# Patient Record
Sex: FEMALE | Race: WHITE | NOT HISPANIC OR LATINO | Employment: UNEMPLOYED | URBAN - METROPOLITAN AREA
[De-identification: names, ages, dates, MRNs, and addresses within clinical notes are randomized per-mention and may not be internally consistent; named-entity substitution may affect disease eponyms.]

---

## 2018-03-08 ENCOUNTER — TRANSCRIBE ORDERS (OUTPATIENT)
Dept: RADIOLOGY | Facility: CLINIC | Age: 58
End: 2018-03-08

## 2018-03-08 ENCOUNTER — APPOINTMENT (OUTPATIENT)
Dept: RADIOLOGY | Facility: CLINIC | Age: 58
End: 2018-03-08
Payer: COMMERCIAL

## 2018-03-08 DIAGNOSIS — S90.32XA CONTUSION OF LEFT FOOT, INITIAL ENCOUNTER: Primary | ICD-10-CM

## 2018-03-08 DIAGNOSIS — S90.32XA CONTUSION OF LEFT FOOT, INITIAL ENCOUNTER: ICD-10-CM

## 2018-03-08 PROCEDURE — 73630 X-RAY EXAM OF FOOT: CPT

## 2021-02-03 ENCOUNTER — OFFICE VISIT (OUTPATIENT)
Dept: URGENT CARE | Facility: CLINIC | Age: 61
End: 2021-02-03
Payer: COMMERCIAL

## 2021-02-03 VITALS
RESPIRATION RATE: 14 BRPM | SYSTOLIC BLOOD PRESSURE: 220 MMHG | WEIGHT: 126 LBS | TEMPERATURE: 99 F | HEART RATE: 93 BPM | BODY MASS INDEX: 20.99 KG/M2 | OXYGEN SATURATION: 99 % | HEIGHT: 65 IN | DIASTOLIC BLOOD PRESSURE: 102 MMHG

## 2021-02-03 DIAGNOSIS — S99.919A ANKLE INJURY, INITIAL ENCOUNTER: Primary | ICD-10-CM

## 2021-02-03 PROCEDURE — 99213 OFFICE O/P EST LOW 20 MIN: CPT | Performed by: NURSE PRACTITIONER

## 2021-02-03 NOTE — PATIENT INSTRUCTIONS
Wear boot when ambulating   Tylenol as needed for pain   Rest, ice, and elevate  Follow up with orthopedics   Obtain a PCP    It is advised that you proceed to the ER for further evaluation and management of your blood pressure  Ankle Sprain   WHAT YOU NEED TO KNOW:   An ankle sprain happens when 1 or more ligaments in your ankle joint stretch or tear  Ligaments are tough tissues that connect bones  Ligaments support your joints and keep your bones in place  DISCHARGE INSTRUCTIONS:   Return to the emergency department if:   · You have severe pain in your ankle  · Your foot or toes are cold or numb  · Your ankle becomes more weak or unstable (wobbly)  · You are unable to put any weight on your ankle or foot  · Your swelling has increased or returned  Call your doctor if:   · Your pain does not go away, even after treatment  · You have questions or concerns about your condition or care  Medicines: You may need any of the following:  · NSAIDs , such as ibuprofen, help decrease swelling, pain, and fever  This medicine is available with or without a doctor's order  NSAIDs can cause stomach bleeding or kidney problems in certain people  If you take blood thinner medicine, always ask your healthcare provider if NSAIDs are safe for you  Always read the medicine label and follow directions  · Acetaminophen  decreases pain and fever  It is available without a doctor's order  Ask how much to take and how often to take it  Follow directions  Read the labels of all other medicines you are using to see if they also contain acetaminophen, or ask your doctor or pharmacist  Acetaminophen can cause liver damage if not taken correctly  Do not use more than 4 grams (4,000 milligrams) total of acetaminophen in one day  · Prescription pain medicine  may be given  Ask your healthcare provider how to take this medicine safely  Some prescription pain medicines contain acetaminophen   Do not take other medicines that contain acetaminophen without talking to your healthcare provider  Too much acetaminophen may cause liver damage  Prescription pain medicine may cause constipation  Ask your healthcare provider how to prevent or treat constipation  · Take your medicine as directed  Contact your healthcare provider if you think your medicine is not helping or if you have side effects  Tell him or her if you are allergic to any medicine  Keep a list of the medicines, vitamins, and herbs you take  Include the amounts, and when and why you take them  Bring the list or the pill bottles to follow-up visits  Carry your medicine list with you in case of an emergency  Self-care:   · Use support devices,  such as a brace, cast, or splint, to limit your movement and protect your joint  You may need to use crutches to decrease your pain as you move around  · Go to physical therapy as directed  A physical therapist teaches you exercises to help improve movement and strength, and to decrease pain  · Rest  your ankle so that it can heal  Return to normal activities as directed  · Apply ice  on your ankle for 15 to 20 minutes every hour or as directed  Use an ice pack, or put crushed ice in a plastic bag  Cover it with a towel  Ice helps prevent tissue damage and decreases swelling and pain  · Compress  your ankle  Ask if you should wrap an elastic bandage around your injured ligament  An elastic bandage provides support and helps decrease swelling and movement so your joint can heal  Wear as long as directed  · Elevate  your ankle above the level of your heart as often as you can  This will help decrease swelling and pain  Prop your ankle on pillows or blankets to keep it elevated comfortably  Prevent another ankle sprain:   · Let your ankle heal   Find out how long your ligament needs to heal  Do not do any physical activity until your healthcare provider says it is okay   If you start activity too soon, you may develop a more serious injury  · Always warm up and stretch  before you exercise or play sports  · Use the right equipment  Always wear shoes that fit well and are made for the activity that you are doing  You may also need ankle supports, elbow and knee pads, or braces  Follow up with your doctor as directed:  Write down your questions so you remember to ask them during your visits  © Copyright [address] Information is for End User's use only and may not be sold, redistributed or otherwise used for commercial purposes  All illustrations and images included in CareNotes® are the copyrighted property of A D [employer] M [employer]  or [address] [place], [place] [address]  The above information is an  only  It is not intended as medical advice for individual conditions or treatments  Talk to your doctor, nurse or pharmacist before following any medical regimen to see if it is safe and effective for you  Chronic Hypertension   WHAT YOU NEED TO KNOW:   Hypertension is high blood pressure  Your blood pressure is the force of your blood moving against the walls of your arteries  Hypertension causes your blood pressure to get so high that your heart has to work much harder than normal  This can damage your heart  Even if you have hypertension for years, lifestyle changes, medicines, or both can help bring your blood pressure to normal   DISCHARGE INSTRUCTIONS:   Call 911 for any of the following:   · You have chest pain  · You have any of the following signs of a heart attack:      ? Squeezing, pressure, or pain in your chest    ? You may  also have any of the following:     § Discomfort or pain in your back, neck, jaw, stomach, or arm    § Shortness of breath    § Nausea or vomiting    § Lightheadedness or a sudden cold sweat    · You become confused or have difficulty speaking  · You suddenly feel lightheaded or have trouble breathing      Return to the emergency department if:   · You have a severe headache or vision loss  · You have weakness in an arm or leg  Contact your healthcare provider if:   · You feel faint, dizzy, confused, or drowsy  · You have been taking your blood pressure medicine but your pressure is higher than your provider says it should be  · You have questions or concerns about your condition or care  Medicines: You may need any of the following:  · Antihypertensives  may be used to help lower your blood pressure  Several kinds of medicines are available  Your healthcare provider may change the medicine or medicines you currently take  This may be needed if your blood pressure is often high when you check it at home or you are having other problems with blood pressure control  · Diuretics  help decrease extra fluid that collects in your body  This will help lower your BP  You may urinate more often while you take this medicine  · Cholesterol medicine  helps lower your cholesterol level  A low cholesterol level helps prevent heart disease and makes it easier to control your blood pressure  · Take your medicine as directed  Contact your healthcare provider if you think your medicine is not helping or if you have side effects  Tell him or her if you are allergic to any medicine  Keep a list of the medicines, vitamins, and herbs you take  Include the amounts, and when and why you take them  Bring the list or the pill bottles to follow-up visits  Carry your medicine list with you in case of an emergency  Follow up with your healthcare provider as directed: You will need to return to have your blood pressure checked and to have other lab tests done  Write down your questions so you remember to ask them during your visits  Stages of hypertension:       · Normal blood pressure is 119/79 or lower   Your healthcare provider may only check your blood pressure each year if it stays at a normal level  · Elevated blood pressure is 120/79 to 129/79    This is sometimes called prehypertension  Your healthcare provider may suggest lifestyle changes to help lower your blood pressure to a normal level  He or she may then check it again in 3 to 6 months  · Stage 1 hypertension is 130/80  to 139/89   Your provider may recommend lifestyle changes, medication, and checks every 3 to 6 months until your blood pressure is controlled  · Stage 2 hypertension is 140/90 or higher   Your provider will recommend lifestyle changes and have you take 2 kinds of hypertension medicines  You will also need to have your blood pressure checked monthly until it is controlled  Manage chronic hypertension:   · Check your blood pressure at home  Avoid smoking, caffeine, and exercise at least 30 minutes before checking your blood pressure  Sit and rest for 5 minutes before you take your blood pressure  Extend your arm and support it on a flat surface  Your arm should be at the same level as your heart  Follow the directions that came with your blood pressure monitor  Check your blood pressure 2 times, 1 minute apart, before you take your medicine in the morning  Also check your blood pressure before your evening meal  Keep a record of your readings and bring it to your follow-up visits  Ask your healthcare provider what your blood pressure should be  · Manage any other health conditions you have  Health conditions such as diabetes can increase your risk for hypertension  Follow your healthcare provider's instructions and take all your medicines as directed  Talk to your healthcare provider about any new health conditions you have recently developed  · Ask about all medicines  Certain medicines can increase your blood pressure  Examples include oral birth control pills, decongestants, herbal supplements, and NSAIDs, such as ibuprofen  Your healthcare provider can tell you which medicines are safe for you to take   This includes prescription and over-the-counter medicines  Lifestyle changes you can make to lower your blood pressure: Your provider may want you to make more lifestyle changes if you are having trouble controlling your blood pressure  This may feel difficult over time, especially if you think you are making good changes but your pressure is still high  It might help to focus on one new change at a time  For example, try to add 1 more day of exercise, or exercise for an extra 10 minutes on 2 days  Small changes can make a big difference  Your healthcare provider can also refer you to specialists such as a dietitian who can help you make small changes  · Limit sodium (salt) as directed  Too much sodium can affect your fluid balance  Check labels to find low-sodium or no-salt-added foods  Some low-sodium foods use potassium salts for flavor  Too much potassium can also cause health problems  Your healthcare provider will tell you how much sodium and potassium are safe for you to have in a day  He or she may recommend that you limit sodium to 2,300 mg a day  · Follow the meal plan recommended by your healthcare provider  A dietitian or your provider can give you more information on low-sodium plans or the DASH (Dietary Approaches to Stop Hypertension) eating plan  The DASH plan is low in sodium, unhealthy fats, and total fat  It is high in potassium, calcium, and fiber  · Exercise to maintain a healthy weight  Exercise at least 30 minutes per day, on most days of the week  This will help decrease your blood pressure  Ask your healthcare provider about the best exercise plan for you  · Decrease stress  This may help lower your blood pressure  Learn ways to relax, such as deep breathing or listening to music  · Limit alcohol as directed  Alcohol can increase your blood pressure  A drink of alcohol is 12 ounces of beer, 5 ounces of wine, or 1½ ounces of liquor  · Do not smoke    Nicotine and other chemicals in cigarettes and cigars can increase your blood pressure and also cause lung damage  Ask your healthcare provider for information if you currently smoke and need help to quit  E-cigarettes or smokeless tobacco still contain nicotine  Talk to your healthcare provider before you use these products  © Copyright [address] Information is for End User's use only and may not be sold, redistributed or otherwise used for commercial purposes  All illustrations and images included in CareNotes® are the copyrighted property of [email or web address]  or [facility] [employer]   The above information is an  only  It is not intended as medical advice for individual conditions or treatments  Talk to your doctor, nurse or pharmacist before following any medical regimen to see if it is safe and effective for you

## 2021-02-04 NOTE — PROGRESS NOTES
[facility]        NAME: Billy Apple is a 56 y o  female  : 1960    MRN: [id number]  DATE: 2021  TIME: 11:58 AM    Assessment and Plan   Ankle injury, initial encounter [[id number]]  1  Ankle injury, initial encounter  XR ankle 3+ vw left    Orthopedic injury treatment    CANCELED: XR ankle 3+ vw left     Discussed elevated blood pressure with patient  She was advised to proceed to the emergency department for evaluation and treatment  She does not have a PCP and denies prior history of HTN  Suspected distal fibular fracture  Walking boot applied to left ankle  Orthopedics referral given to patient   to assist with scheduling appointment  Patient Instructions   Wear boot when ambulating   Tylenol as needed for pain   Rest, ice, and elevate  Follow up with orthopedics   Obtain a PCP    It is advised that you proceed to the ER for further evaluation and management of your blood pressure  Follow up with PCP in 3-5 days  Proceed to  ER if symptoms worsen  Chief Complaint     Chief Complaint   Patient presents with    Ankle Pain     Slipped on ice  night and twisted left ankle-pt minimaly elevated  -pt did not put ice on foot after injury          History of Present Illness       Patient is a 56year old female presenting with left ankle pain  She states that she slipped on ice and twisted her ankle  Pain is worse with ambulating  Denies N/T/W  She took tylenol and applied ice  Review of Systems   Review of Systems   Constitutional: Negative for activity change, chills and fever  Respiratory: Negative for cough and shortness of breath  Gastrointestinal: Negative for diarrhea, nausea and vomiting  Musculoskeletal: Positive for arthralgias  Negative for joint swelling  Skin: Negative for color change and wound  Neurological: Negative for dizziness, weakness, numbness and headaches           Current Medications     No current outpatient medications on file  Current Allergies     Allergies as of 02/03/2021    (No Known Allergies)            The following portions of the patient's history were reviewed and updated as appropriate: allergies, current medications, past family history, past medical history, past social history, past surgical history and problem list      History reviewed  No pertinent past medical history  History reviewed  No pertinent surgical history  No family history on file  Medications have been verified  Objective   BP (!) 220/102   Pulse 93   Temp 99 °F (37 2 °C)   Resp 14   Ht 5' 5" (1 651 m)   Wt 57 2 kg (126 lb)   SpO2 99%   BMI 20 97 kg/m²      Left ankle xray: Suspected distal fibular fracture  Physical Exam     Physical Exam  Vitals signs reviewed  Constitutional:       General: She is awake  She is not in acute distress  Appearance: Normal appearance  HENT:      Head: Normocephalic  Cardiovascular:      Rate and Rhythm: Normal rate and regular rhythm  Pulses:           Dorsalis pedis pulses are 2+ on the left side  Posterior tibial pulses are 2+ on the left side  Heart sounds: Normal heart sounds, S1 normal and S2 normal    Pulmonary:      Effort: Pulmonary effort is normal       Breath sounds: Normal breath sounds  No decreased breath sounds, wheezing, rhonchi or rales  Musculoskeletal:        Feet:    Feet:      Left foot:      Skin integrity: Skin integrity normal    Skin:     General: Skin is warm and moist    Neurological:      General: No focal deficit present  Mental Status: She is alert, oriented to person, place, and time and easily aroused  Psychiatric:         Behavior: Behavior is cooperative         Orthopedic injury treatment    Date/Time: 2/3/2021 12:14 PM  Performed by: ADAM Bonds  Authorized by: ADAM Bonds     Patient Location:  Clinic  Consent given by:  Patient  Patient identity confirmed:  Verbally with patient  Injury location:  Ankle  Neurovascular status: Neurovascularly intact    Distal perfusion: normal    Neurological function: normal    Range of motion: reduced    Local anesthesia used?: No    Immobilization: walking boot    Splint type: walking boot   Neurovascular status: Neurovascularly intact    Distal perfusion: normal    Neurological function: normal    Range of motion: unchanged    Patient tolerance:  Patient tolerated the procedure well with no immediate complications

## 2021-02-09 ENCOUNTER — APPOINTMENT (OUTPATIENT)
Dept: RADIOLOGY | Facility: CLINIC | Age: 61
End: 2021-02-09
Payer: COMMERCIAL

## 2021-02-09 ENCOUNTER — OFFICE VISIT (OUTPATIENT)
Dept: FAMILY MEDICINE CLINIC | Facility: CLINIC | Age: 61
End: 2021-02-09
Payer: COMMERCIAL

## 2021-02-09 ENCOUNTER — OFFICE VISIT (OUTPATIENT)
Dept: OBGYN CLINIC | Facility: CLINIC | Age: 61
End: 2021-02-09
Payer: COMMERCIAL

## 2021-02-09 VITALS
WEIGHT: 126 LBS | DIASTOLIC BLOOD PRESSURE: 100 MMHG | HEART RATE: 83 BPM | OXYGEN SATURATION: 99 % | HEIGHT: 65 IN | SYSTOLIC BLOOD PRESSURE: 170 MMHG | BODY MASS INDEX: 20.99 KG/M2

## 2021-02-09 VITALS
BODY MASS INDEX: 20.99 KG/M2 | HEIGHT: 65 IN | DIASTOLIC BLOOD PRESSURE: 123 MMHG | WEIGHT: 126 LBS | HEART RATE: 96 BPM | SYSTOLIC BLOOD PRESSURE: 186 MMHG

## 2021-02-09 DIAGNOSIS — F17.200 CURRENT EVERY DAY SMOKER: ICD-10-CM

## 2021-02-09 DIAGNOSIS — Z80.3 FAMILY HISTORY OF BREAST CANCER IN MOTHER: ICD-10-CM

## 2021-02-09 DIAGNOSIS — S82.839A AVULSION FRACTURE OF DISTAL FIBULA: Primary | ICD-10-CM

## 2021-02-09 DIAGNOSIS — S82.392A CLOSED FRACTURE OF POSTERIOR MALLEOLUS OF LEFT TIBIA, INITIAL ENCOUNTER: ICD-10-CM

## 2021-02-09 DIAGNOSIS — Z78.9 HEAVY DRINKER OF ALCOHOL: ICD-10-CM

## 2021-02-09 DIAGNOSIS — R41.3 MEMORY LOSS: ICD-10-CM

## 2021-02-09 DIAGNOSIS — F32.9 REACTIVE DEPRESSION: ICD-10-CM

## 2021-02-09 DIAGNOSIS — Z12.31 ENCOUNTER FOR SCREENING MAMMOGRAM FOR MALIGNANT NEOPLASM OF BREAST: ICD-10-CM

## 2021-02-09 DIAGNOSIS — Z82.49 FAMILY HISTORY OF HEART ATTACK: ICD-10-CM

## 2021-02-09 DIAGNOSIS — Z76.89 ENCOUNTER TO ESTABLISH CARE: Primary | ICD-10-CM

## 2021-02-09 DIAGNOSIS — S82.839A AVULSION FRACTURE OF DISTAL FIBULA: ICD-10-CM

## 2021-02-09 DIAGNOSIS — Z00.00 ANNUAL PHYSICAL EXAM: ICD-10-CM

## 2021-02-09 DIAGNOSIS — Z12.11 COLON CANCER SCREENING: ICD-10-CM

## 2021-02-09 DIAGNOSIS — Z12.4 CERVICAL CANCER SCREENING: ICD-10-CM

## 2021-02-09 DIAGNOSIS — R03.0 ELEVATED BLOOD PRESSURE READING: ICD-10-CM

## 2021-02-09 DIAGNOSIS — M25.572 PAIN, JOINT, ANKLE AND FOOT, LEFT: ICD-10-CM

## 2021-02-09 DIAGNOSIS — Z13.0 SCREENING FOR DEFICIENCY ANEMIA: ICD-10-CM

## 2021-02-09 DIAGNOSIS — Z11.59 NEED FOR HEPATITIS C SCREENING TEST: ICD-10-CM

## 2021-02-09 DIAGNOSIS — Z28.21 PNEUMOCOCCAL VACCINATION DECLINED BY PATIENT: ICD-10-CM

## 2021-02-09 PROCEDURE — 99203 OFFICE O/P NEW LOW 30 MIN: CPT | Performed by: ORTHOPAEDIC SURGERY

## 2021-02-09 PROCEDURE — 73610 X-RAY EXAM OF ANKLE: CPT

## 2021-02-09 PROCEDURE — 27786 TREATMENT OF ANKLE FRACTURE: CPT | Performed by: ORTHOPAEDIC SURGERY

## 2021-02-09 PROCEDURE — 99204 OFFICE O/P NEW MOD 45 MIN: CPT | Performed by: FAMILY MEDICINE

## 2021-02-09 PROCEDURE — 99386 PREV VISIT NEW AGE 40-64: CPT | Performed by: FAMILY MEDICINE

## 2021-02-09 RX ORDER — LISINOPRIL 10 MG/1
10 TABLET ORAL DAILY
Qty: 30 TABLET | Refills: 0 | Status: SHIPPED | OUTPATIENT
Start: 2021-02-09 | End: 2021-02-24

## 2021-02-09 NOTE — PROGRESS NOTES
Assessment/Plan:   Diagnoses and all orders for this visit:    Encounter to establish care  Annual physical exam  - reviewed PMHx, PSHx, meds, allergies, FHx, Soc Hx and Sexual Hx  - UTD with Tdap and Flu vaccine for this season   - declined Pneumovax at today's visit  - no recent labs - script given   - does not recall last PAP, Mammo - of note, (+) FHx of Breast Ca in Mom - referred to Ob/Gyn and script given for Mammo   - declined STD screening in the office today   - has never had Colon Ca screening - script given for Cologuard and referral to GI   - (+) visual impairment - referred to Optho   - advised to schedule with Dentist  - RTO in 1yr for annual   Pneumococcal vaccination declined by patient    Avulsion fracture of distal fibula  -     DXA bone density spine hip and pelvis; Future  - follows with Ortho - management per Specialist team     Family history of breast cancer in mother  - Mom d/w Breast Ca at 67yo   Encounter for screening mammogram for malignant neoplasm of breast  -     Mammo screening bilateral w 3d & cad; Future  Cervical cancer screening  -     Ambulatory referral to Gynecology; Future    Colon cancer screening  -     Ambulatory referral for colonoscopy; Future  -     Cologuard; Future    Screening for deficiency anemia  -     CBC and differential; Future  -     Iron Panel (Includes Ferritin, Iron Sat%, Iron, and TIBC); Future    Need for hepatitis C screening test  -     Hepatitis C antibody; Future    Elevated blood pressure reading  -     Comprehensive metabolic panel; Future  -     Microalbumin / creatinine urine ratio  -     lisinopril (ZESTRIL) 10 mg tablet; Take 1 tablet (10 mg total) by mouth daily  -     Ambulatory referral to Ophthalmology;  Future  - BP in the office 140/88 and on my repeat 170/100   - higher at Ortho appt earlier today   - (+) drinks 24oz of coffee/day -- advised to cut back on caffeine intake   - (+) drinks 4glasses of wine or 2 [place] QD -- advised to cut back on EtOH intake   - (+) current smoker (2-4cigs/day) -- smoking cessation advised   - (+) FHx of MI in Father at 65yo and PGF at 61yo   - no recent labs - script given   - will empirically start on ACEI 10mg QD   - advised to RTO in 2wks with labs and BP cuff for f/u - pt aware and agreeable   Family history of heart attack    Heavy drinker of alcohol  -     Vitamin D 25 hydroxy; Future  -     Vitamin B12; Future  - (+) drinks 4glasses of wine or 2 [place] QD -- advised to cut back on EtOH intake   Current every day smoker  (+) current smoker (2-4cigs/day) -- smoking cessation advised   Memory loss    Reactive depression  -     TSH, 3rd generation with Free T4 reflex  -     Ambulatory referral to [facility]; Future  - PHQ-9 score of 5 in the office today   - a horse (of 21yrs), 2 dogs and kitten passed away recently   - maybe compensating with heavy EtOH use/abuse  - referred to interim in-office therapist - pt aware and agreeable         Subjective:    Patient ID: Misti Bach is a 56 y o  female    Misti Bach is a 56 y o  female who presents to the office to establish care/annual exam and eval of high pressures   1) Establish Care/Annual Exam   - prior PCP: does not have one   - PMHx: Avulsion fracture of distal fibula  - allergies: NKDA  - Meds: none   - PSHx: wisdom tooth extraction   - FHx: Mother (breast ca at 65yo), F (MI at 61), PGF (MI at 61), denies FHx of DM/HTN/HL   - Immunizations: UTD with Tdap (2020) and Flu vaccine this season; declined Pneumovax in the office today   - GYN Hx: does not have an Ob/Gyn, last PAP/Mammo was >3yrs ago   - Hm: has never had colon ca screening   - diet/exercise: rides horses, diet: varied   - social: drinks 4glasses of wine or 2 [place] QD, current smoker ([id number]/CJB), denies illicits   - sexual Hx: declined STD screening in the office today   - last vision: has reading glasses from the [address]  - last dental: does not recall   - ROS: today in the office pt denies F/C/N/V/visual changes/CP/SOB/wheezing/abd pain/D/LE edema or calf tenderness  2) Reactive Depression  - PHQ-9 score of 5  - a horse (of 21yrs), 2 dogs and kitten passed away recently   3) elevated BP   - BP in the office 140/88 and on my repeat 170/100   - (+) drinks 24oz of coffee/day   - (+) drinks 4glasses of wine or 2 [place] QD  - (+) current smoker (2-4cigs/day)  - denies change in vision  - (+) HA, intermittent heart racing   - (+) FHx of MI in Father and PGF btw 60-62yo   - no recent labs       The following portions of the patient's history were reviewed and updated as appropriate: allergies, current medications, past family history, past medical history, past social history, past surgical history and problem list     Review of Systems  as per HPI    Objective:  /100 (BP Location: Left arm, Patient Position: Sitting, Cuff Size: Standard)   Pulse 83   Ht 5' 5" (1 651 m)   Wt 57 2 kg (126 lb)   SpO2 99%   BMI 20 97 kg/m²    Physical Exam  Vitals signs reviewed  Constitutional:       General: She is not in acute distress  Appearance: Normal appearance  She is normal weight  She is not ill-appearing, toxic-appearing or diaphoretic  HENT:      Head: Normocephalic and atraumatic  Right Ear: External ear normal       Left Ear: External ear normal    Eyes:      General: No scleral icterus  Right eye: No discharge  Left eye: No discharge  Extraocular Movements: Extraocular movements intact  Conjunctiva/sclera: Conjunctivae normal    Neck:      Musculoskeletal: Normal range of motion  Cardiovascular:      Rate and Rhythm: Normal rate and regular rhythm  Heart sounds: Normal heart sounds  No murmur  No friction rub  No gallop  Pulmonary:      Effort: Pulmonary effort is normal  No respiratory distress  Breath sounds: Normal breath sounds  No stridor  No wheezing, rhonchi or rales     Musculoskeletal:      Right lower leg: No edema  Comments: (+) boot on LLE - Avulsion fracture of distal fibula   Skin:     General: Skin is warm  Neurological:      General: No focal deficit present  Mental Status: She is alert and oriented to person, place, and time  Psychiatric:         Attention and Perception: Attention normal          Mood and Affect: Mood and affect normal          Speech: Speech normal          Behavior: Behavior normal  Behavior is cooperative  Thought Content: Thought content normal  Thought content does not include homicidal or suicidal ideation  Thought content does not include homicidal or suicidal plan  Cognition and Memory: Cognition normal          Judgment: Judgment normal       Comments: PHQ-9 score of 5 in the office today        Depression Screening Follow-up Plan: Patient's depression screening was positive with a PHQ-2 score of 3  Their PHQ-9 score was 5  Patient assessed for underlying major depression  They have no active suicidal ideations  Brief counseling provided and recommend additional follow-up/re-evaluation next office visit  Continue regular follow-up with their psychologist/therapist/psychiatrist who is managing their mental health condition(s)  Patient advised to follow-up with PCP for further management  I have spent 60 minutes with Patient  today in which greater than 50% of this time was spent in counseling/coordination of care regarding Prognosis, Risks and benefits of tx options, Intructions for management, Patient and family education, Importance of tx compliance, Risk factor reductions and Impressions

## 2021-02-09 NOTE — PROGRESS NOTES
Assessment/Plan:  1  Avulsion fracture of distal fibula  XR ankle 3+ vw left   2  Closed fracture of posterior malleolus of left tibia, initial encounter         Shae has a left ankle injury which appears to be an avulsion fracture of the distal fibula which was seen on previous x-ray and much clearer on today's imaging  She also appears to have a posterior malleolus fracture which appears nondisplaced and should remain stable  We will treat her nonsurgically at this time and keep her in the Cam walker boot  She will follow up in 3 weeks for repeat x-ray and evaluation  Subjective:   Maria Canales is a 56 y o  female who presents   To the office for evaluation for a left ankle injury which occurred 1 week ago  She slipped and fell on ice and twisted her left ankle  She went to urgent care that day where she had an x-ray concerning for an avulsion fracture of the lateral  Malleolus  She has been placed in a Cam walker boot  She states that her ankle feels much better after walking in the boot for the last week  She still has some swelling and bruising throughout the ankle  It worsens with motion improves with rest   She denies any numbness or tingling throughout her foot  Review of Systems   Constitutional: Negative for chills, fever and unexpected weight change  HENT: Negative for hearing loss, nosebleeds and sore throat  Eyes: Negative for pain, redness and visual disturbance  Respiratory: Negative for cough, shortness of breath and wheezing  Cardiovascular: Negative for chest pain, palpitations and leg swelling  Gastrointestinal: Negative for abdominal pain, nausea and vomiting  Endocrine: Negative for polydipsia and polyuria  Genitourinary: Negative for dysuria and hematuria  Musculoskeletal:        See HPI   Skin: Negative for rash and wound  Neurological: Negative for dizziness, numbness and headaches     Psychiatric/Behavioral: Negative for decreased concentration and suicidal ideas  The patient is not nervous/anxious  History reviewed  No pertinent past medical history  History reviewed  No pertinent surgical history  History reviewed  No pertinent family history  Social History     Occupational History    Not on file   Tobacco Use    Smoking status: Current Every Day Smoker    Smokeless tobacco: Never Used   Substance and Sexual Activity    Alcohol use: Yes    Drug use: Never    Sexual activity: Not on file       No current outpatient medications on file  No Known Allergies    Objective:  Vitals:    02/09/21 1143   BP: (!) 186/123   Pulse: 96       Left Ankle Exam     Tenderness   The patient is experiencing tenderness in the lateral malleolus and medial malleolus  Swelling: mild    Range of Motion   Dorsiflexion: normal   Plantar flexion: normal   Eversion:  15 abnormal   Inversion:  20 abnormal     Muscle Strength   Dorsiflexion:  5/5   Plantar flexion:  5/5   Anterior tibial:  5/5   Posterior tibial:  5/5  Gastrocsoleus:  5/5  Peroneal muscle:  4/5    Tests   Anterior drawer: negative    Other   Erythema: absent  Sensation: normal  Pulse: present          Strength/Myotome Testing     Left Ankle/Foot   Dorsiflexion: 5  Plantar flexion: 5      Physical Exam  Vitals signs reviewed  Constitutional:       Appearance: She is well-developed  HENT:      Head: Normocephalic and atraumatic  Eyes:      Conjunctiva/sclera: Conjunctivae normal       Pupils: Pupils are equal, round, and reactive to light  Neck:      Musculoskeletal: Normal range of motion and neck supple  Cardiovascular:      Rate and Rhythm: Normal rate  Pulmonary:      Effort: Pulmonary effort is normal  No respiratory distress  Skin:     General: Skin is warm and dry  Neurological:      Mental Status: She is alert and oriented to person, place, and time     Psychiatric:         Behavior: Behavior normal            I have personally reviewed pertinent films in PACS and my interpretation is as follows: Three-view x-rays of the left ankle in the office today demonstrates a nondisplaced avulsion fracture of the distal fibula as well as a nondisplaced posterior malleolus avulsion fracture  Fracture / Dislocation Treatment    Date/Time: 2/9/2021 1:36 PM  Performed by: Alvan Spurling, DO  Authorized by: Alvan Spurling, DO     Patient Location:  Clinic  Verbal consent obtained?: Yes    Risks and benefits: Risks, benefits and alternatives were discussed    Radiology Images displayed and confirmed   If images not available, report reviewed: Yes    Injury location:  Ankle  Location details:  Left ankle  Injury type:  Fracture  Fracture type: lateral malleolus    Fracture type: lateral malleolus    Neurovascular status: Neurovascularly intact    Manipulation performed?: No

## 2021-02-09 NOTE — PROGRESS NOTES
Assessment/Plan:   Diagnoses and all orders for this visit:    Avulsion fracture of distal fibula  -     DXA bone density spine hip and pelvis; Future  - follows with Ortho - management per Specialist team     Elevated blood pressure reading  -     Comprehensive metabolic panel; Future  -     Microalbumin / creatinine urine ratio  -     lisinopril (ZESTRIL) 10 mg tablet; Take 1 tablet (10 mg total) by mouth daily  -     Ambulatory referral to Ophthalmology; Future  - BP in the office 140/88 and on my repeat 170/100   - higher at Ortho appt earlier today   - (+) drinks 24oz of coffee/day -- advised to cut back on caffeine intake   - (+) drinks 4glasses of wine or 2 [place] QD -- advised to cut back on EtOH intake   - (+) current smoker (2-4cigs/day) -- smoking cessation advised   - (+) FHx of MI in Father at 63yo and PGF at 61yo   - no recent labs - script given   - will empirically start on ACEI 10mg QD   - advised to RTO in 2wks with labs and BP cuff for f/u - pt aware and agreeable   Family history of heart attack  Heavy drinker of alcohol  -     Vitamin D 25 hydroxy; Future  -     Vitamin B12; Future  - (+) drinks 4glasses of wine or 2 [place] QD -- advised to cut back on EtOH intake   Current every day smoker  (+) current smoker (2-4cigs/day) -- smoking cessation advised   Memory loss    Reactive depression  -     TSH, 3rd generation with Free T4 reflex  -     Ambulatory referral to [facility]; Future  - PHQ-9 score of 5 in the office today   - a horse (of 21yrs), 2 dogs and kitten passed away recently   - maybe compensating with heavy EtOH use/abuse  - referred to interim in-office therapist - pt aware and agreeable         Subjective:    Patient ID: John Paul Douglass is a 56 y o  female    John Paul Douglass is a 56 y o  female who presents to the office to establish care/annual exam and eval of high pressures   1) Establish Care/Annual Exam   - prior PCP: does not have one   - PMHx: Avulsion fracture of distal fibula  - allergies: NKDA  - Meds: none   - PSHx: wisdom tooth extraction   - FHx: Mother (breast ca at 65yo), F (MI at 61), PGF (MI at 61), denies FHx of DM/HTN/HL   - Immunizations: UTD with Tdap (2020) and Flu vaccine this season; declined Pneumovax in the office today   - GYN Hx: does not have an Ob/Gyn, last PAP/Mammo was >3yrs ago   - Hm: has never had colon ca screening   - diet/exercise: rides horses, diet: varied   - social: drinks 4glasses of wine or 2 [place] QD, current smoker ([id number]), denies illicits   - sexual Hx: declined STD screening in the office today   - last vision: has reading glasses from the [employer]  - last dental: does not recall   - ROS: today in the office pt denies F/C/N/V/visual changes/CP/SOB/wheezing/abd pain/D/LE edema or calf tenderness  2) Reactive Depression  - PHQ-9 score of 5  - a horse (of 21yrs), 2 dogs and kitten passed away recently   3) elevated BP   - BP in the office 140/88 and on my repeat 170/100   - (+) drinks 24oz of coffee/day   - (+) drinks 4glasses of wine or 2 [place] QD  - (+) current smoker (2-4cigs/day)  - denies change in vision  - (+) HA, intermittent heart racing   - (+) FHx of MI in Father and PGF btw 60-62yo   - no recent labs       The following portions of the patient's history were reviewed and updated as appropriate: allergies, current medications, past family history, past medical history, past social history, past surgical history and problem list     Review of Systems  as per HPI    Objective:  /100 (BP Location: Left arm, Patient Position: Sitting, Cuff Size: Standard)   Pulse 83   Ht 5' 5" (1 651 m)   Wt 57 2 kg (126 lb)   SpO2 99%   BMI 20 97 kg/m²    Physical Exam  Vitals signs reviewed  Constitutional:       General: She is not in acute distress  Appearance: Normal appearance  She is normal weight  She is not ill-appearing, toxic-appearing or diaphoretic     HENT:      Head: Normocephalic and atraumatic  Right Ear: External ear normal       Left Ear: External ear normal    Eyes:      General: No scleral icterus  Right eye: No discharge  Left eye: No discharge  Extraocular Movements: Extraocular movements intact  Conjunctiva/sclera: Conjunctivae normal    Neck:      Musculoskeletal: Normal range of motion  Cardiovascular:      Rate and Rhythm: Normal rate and regular rhythm  Heart sounds: Normal heart sounds  No murmur  No friction rub  No gallop  Pulmonary:      Effort: Pulmonary effort is normal  No respiratory distress  Breath sounds: Normal breath sounds  No stridor  No wheezing, rhonchi or rales  Musculoskeletal:      Right lower leg: No edema  Comments: (+) boot on LLE - Avulsion fracture of distal fibula   Skin:     General: Skin is warm  Neurological:      General: No focal deficit present  Mental Status: She is alert and oriented to person, place, and time  Psychiatric:         Attention and Perception: Attention normal          Mood and Affect: Mood and affect normal          Speech: Speech normal          Behavior: Behavior normal  Behavior is cooperative  Thought Content: Thought content normal  Thought content does not include homicidal or suicidal ideation  Thought content does not include homicidal or suicidal plan  Cognition and Memory: Cognition normal          Judgment: Judgment normal       Comments: PHQ-9 score of 5 in the office today        Depression Screening Follow-up Plan: Patient's depression screening was positive with a PHQ-2 score of 3  Their PHQ-9 score was 5  Patient assessed for underlying major depression  They have no active suicidal ideations  Brief counseling provided and recommend additional follow-up/re-evaluation next office visit  Continue regular follow-up with their psychologist/therapist/psychiatrist who is managing their mental health condition(s)   Patient advised to follow-up with PCP for further management  I have spent 60 minutes with Patient  today in which greater than 50% of this time was spent in counseling/coordination of care regarding Prognosis, Risks and benefits of tx options, Intructions for management, Patient and family education, Importance of tx compliance, Risk factor reductions and Impressions

## 2021-02-09 NOTE — PATIENT INSTRUCTIONS

## 2021-02-16 ENCOUNTER — TELEPHONE (OUTPATIENT)
Dept: GASTROENTEROLOGY | Facility: CLINIC | Age: 61
End: 2021-02-16

## 2021-02-17 ENCOUNTER — LAB (OUTPATIENT)
Dept: LAB | Facility: CLINIC | Age: 61
End: 2021-02-17
Payer: COMMERCIAL

## 2021-02-17 ENCOUNTER — TRANSCRIBE ORDERS (OUTPATIENT)
Dept: LAB | Facility: CLINIC | Age: 61
End: 2021-02-17

## 2021-02-17 DIAGNOSIS — Z78.9 HEAVY DRINKER OF ALCOHOL: ICD-10-CM

## 2021-02-17 DIAGNOSIS — Z13.0 SCREENING FOR DEFICIENCY ANEMIA: ICD-10-CM

## 2021-02-17 DIAGNOSIS — Z11.59 NEED FOR HEPATITIS C SCREENING TEST: ICD-10-CM

## 2021-02-17 DIAGNOSIS — R03.0 ELEVATED BLOOD PRESSURE READING: ICD-10-CM

## 2021-02-17 LAB
25(OH)D3 SERPL-MCNC: 14.8 NG/ML (ref 30–100)
ALBUMIN SERPL BCP-MCNC: 4 G/DL (ref 3.5–5)
ALP SERPL-CCNC: 67 U/L (ref 46–116)
ALT SERPL W P-5'-P-CCNC: 20 U/L (ref 12–78)
ANION GAP SERPL CALCULATED.3IONS-SCNC: 8 MMOL/L (ref 4–13)
AST SERPL W P-5'-P-CCNC: 22 U/L (ref 5–45)
BASOPHILS # BLD AUTO: 0.04 THOUSANDS/ΜL (ref 0–0.1)
BASOPHILS NFR BLD AUTO: 1 % (ref 0–1)
BILIRUB SERPL-MCNC: 0.95 MG/DL (ref 0.2–1)
BUN SERPL-MCNC: 10 MG/DL (ref 5–25)
CALCIUM SERPL-MCNC: 9.7 MG/DL (ref 8.3–10.1)
CHLORIDE SERPL-SCNC: 106 MMOL/L (ref 100–108)
CO2 SERPL-SCNC: 29 MMOL/L (ref 21–32)
CREAT SERPL-MCNC: 0.88 MG/DL (ref 0.6–1.3)
CREAT UR-MCNC: 230 MG/DL
EOSINOPHIL # BLD AUTO: 0.03 THOUSAND/ΜL (ref 0–0.61)
EOSINOPHIL NFR BLD AUTO: 1 % (ref 0–6)
ERYTHROCYTE [DISTWIDTH] IN BLOOD BY AUTOMATED COUNT: 12.5 % (ref 11.6–15.1)
FERRITIN SERPL-MCNC: 166 NG/ML (ref 8–388)
GFR SERPL CREATININE-BSD FRML MDRD: 71 ML/MIN/1.73SQ M
GLUCOSE SERPL-MCNC: 100 MG/DL (ref 65–140)
HCT VFR BLD AUTO: 43.8 % (ref 34.8–46.1)
HGB BLD-MCNC: 14.7 G/DL (ref 11.5–15.4)
IMM GRANULOCYTES # BLD AUTO: 0.01 THOUSAND/UL (ref 0–0.2)
IMM GRANULOCYTES NFR BLD AUTO: 0 % (ref 0–2)
IRON SATN MFR SERPL: 40 %
IRON SERPL-MCNC: 118 UG/DL (ref 50–170)
LYMPHOCYTES # BLD AUTO: 2.05 THOUSANDS/ΜL (ref 0.6–4.47)
LYMPHOCYTES NFR BLD AUTO: 38 % (ref 14–44)
MCH RBC QN AUTO: 34 PG (ref 26.8–34.3)
MCHC RBC AUTO-ENTMCNC: 33.6 G/DL (ref 31.4–37.4)
MCV RBC AUTO: 101 FL (ref 82–98)
MICROALBUMIN UR-MCNC: 18.4 MG/L (ref 0–20)
MICROALBUMIN/CREAT 24H UR: 8 MG/G CREATININE (ref 0–30)
MONOCYTES # BLD AUTO: 0.54 THOUSAND/ΜL (ref 0.17–1.22)
MONOCYTES NFR BLD AUTO: 10 % (ref 4–12)
NEUTROPHILS # BLD AUTO: 2.67 THOUSANDS/ΜL (ref 1.85–7.62)
NEUTS SEG NFR BLD AUTO: 50 % (ref 43–75)
NRBC BLD AUTO-RTO: 0 /100 WBCS
PLATELET # BLD AUTO: 333 THOUSANDS/UL (ref 149–390)
PMV BLD AUTO: 8.9 FL (ref 8.9–12.7)
POTASSIUM SERPL-SCNC: 3.9 MMOL/L (ref 3.5–5.3)
PROT SERPL-MCNC: 7.2 G/DL (ref 6.4–8.2)
RBC # BLD AUTO: 4.32 MILLION/UL (ref 3.81–5.12)
SODIUM SERPL-SCNC: 143 MMOL/L (ref 136–145)
TIBC SERPL-MCNC: 296 UG/DL (ref 250–450)
TSH SERPL DL<=0.05 MIU/L-ACNC: 1.72 UIU/ML (ref 0.36–3.74)
VIT B12 SERPL-MCNC: 276 PG/ML (ref 100–900)
WBC # BLD AUTO: 5.34 THOUSAND/UL (ref 4.31–10.16)

## 2021-02-17 PROCEDURE — 82043 UR ALBUMIN QUANTITATIVE: CPT | Performed by: FAMILY MEDICINE

## 2021-02-17 PROCEDURE — 83550 IRON BINDING TEST: CPT

## 2021-02-17 PROCEDURE — 82570 ASSAY OF URINE CREATININE: CPT | Performed by: FAMILY MEDICINE

## 2021-02-17 PROCEDURE — 82607 VITAMIN B-12: CPT

## 2021-02-17 PROCEDURE — 84443 ASSAY THYROID STIM HORMONE: CPT | Performed by: FAMILY MEDICINE

## 2021-02-17 PROCEDURE — 82306 VITAMIN D 25 HYDROXY: CPT

## 2021-02-17 PROCEDURE — 80053 COMPREHEN METABOLIC PANEL: CPT

## 2021-02-17 PROCEDURE — 82728 ASSAY OF FERRITIN: CPT

## 2021-02-17 PROCEDURE — 86803 HEPATITIS C AB TEST: CPT

## 2021-02-17 PROCEDURE — 85025 COMPLETE CBC W/AUTO DIFF WBC: CPT

## 2021-02-17 PROCEDURE — 36415 COLL VENOUS BLD VENIPUNCTURE: CPT

## 2021-02-17 PROCEDURE — 83540 ASSAY OF IRON: CPT

## 2021-02-18 LAB — HCV AB SER QL: NORMAL

## 2021-02-19 DIAGNOSIS — E55.9 VITAMIN D DEFICIENCY: Primary | ICD-10-CM

## 2021-02-24 ENCOUNTER — OFFICE VISIT (OUTPATIENT)
Dept: FAMILY MEDICINE CLINIC | Facility: CLINIC | Age: 61
End: 2021-02-24
Payer: COMMERCIAL

## 2021-02-24 VITALS
DIASTOLIC BLOOD PRESSURE: 90 MMHG | SYSTOLIC BLOOD PRESSURE: 144 MMHG | HEIGHT: 65 IN | HEART RATE: 103 BPM | BODY MASS INDEX: 20.99 KG/M2 | RESPIRATION RATE: 16 BRPM | WEIGHT: 126 LBS | OXYGEN SATURATION: 99 %

## 2021-02-24 DIAGNOSIS — Z28.21 PNEUMOCOCCAL VACCINATION DECLINED BY PATIENT: ICD-10-CM

## 2021-02-24 DIAGNOSIS — I10 ESSENTIAL HYPERTENSION: Primary | ICD-10-CM

## 2021-02-24 DIAGNOSIS — Z78.9 HEAVY DRINKER OF ALCOHOL: ICD-10-CM

## 2021-02-24 DIAGNOSIS — F34.1 DYSTHYMIA: ICD-10-CM

## 2021-02-24 DIAGNOSIS — Z82.49 FAMILY HISTORY OF HEART ATTACK: ICD-10-CM

## 2021-02-24 DIAGNOSIS — F17.200 CURRENT EVERY DAY SMOKER: ICD-10-CM

## 2021-02-24 DIAGNOSIS — S82.839A AVULSION FRACTURE OF DISTAL FIBULA: ICD-10-CM

## 2021-02-24 DIAGNOSIS — E55.9 VITAMIN D DEFICIENCY: ICD-10-CM

## 2021-02-24 DIAGNOSIS — Z12.4 CERVICAL CANCER SCREENING: ICD-10-CM

## 2021-02-24 DIAGNOSIS — Z78.9 EXCESSIVE CAFFEINE INTAKE: ICD-10-CM

## 2021-02-24 DIAGNOSIS — Z13.220 SCREENING FOR LIPID DISORDERS: ICD-10-CM

## 2021-02-24 PROCEDURE — 99214 OFFICE O/P EST MOD 30 MIN: CPT | Performed by: FAMILY MEDICINE

## 2021-02-24 PROCEDURE — 3725F SCREEN DEPRESSION PERFORMED: CPT | Performed by: FAMILY MEDICINE

## 2021-02-24 RX ORDER — LISINOPRIL 20 MG/1
20 TABLET ORAL DAILY
Qty: 30 TABLET | Refills: 0 | Status: SHIPPED | OUTPATIENT
Start: 2021-02-24 | End: 2021-03-24

## 2021-02-24 RX ORDER — SERTRALINE HYDROCHLORIDE 25 MG/1
25 TABLET, FILM COATED ORAL DAILY
Qty: 30 TABLET | Refills: 0 | Status: SHIPPED | OUTPATIENT
Start: 2021-02-24 | End: 2021-02-26 | Stop reason: ALTCHOICE

## 2021-02-24 NOTE — PROGRESS NOTES
Assessment/Plan:   Diagnoses and all orders for this visit:    Essential hypertension  -     lisinopril (ZESTRIL) 20 mg tablet; Take 1 tablet (20 mg total) by mouth daily  - BP in the office 148/90 and on my repeat 144/90  - (+) intermittent palpitations   - (+) drinks 24oz of coffee/day - has been trying to cut down to 1/2   - (+) drinks 4glasses of wine or 2 [place] QD -- trying to cut back as has begun to feel guilty re EtOH consumption   - (+) current smoker (2-4cigs/day) - trying to stop   - (+) FHx of MI in Father and PGF btw 60-64yo   - nml renal function and urine microalbumin   - will increase ACEI from 10mg QD to 20mg QD and advised to RTO in 1month for f/u - pt aware and agreeable   Family history of heart attack  Heavy drinker of alcohol  Excessive caffeine intake  Current every day smoker    Dysthymia  -     sertraline (ZOLOFT) 25 mg tablet; Take 1 tablet (25 mg total) by mouth daily  -     Ambulatory referral to [facility]; Future   - PHQ-9 score of 6 <-- 5 at last OV 2wks ago   - a horse (of 21yrs), 2 dogs and kitten passed away recently   - has had thoughts of death/dying but no plan  - has begun to affect her marriage   - nml TSH   - (+) Vit D deficiency   - will start on low dose SSRI   - referred to Therapist  - RTO in 4wks for f/u - pt aware and agreeable     Cervical cancer screening  -     Ambulatory referral to Gynecology; Future    Vitamin D deficiency  -     Cholecalciferol 1 25 MG (82148 UT) TABS; Take 1 tablet (50,000 Units total) by mouth once a week x12wks and then switch to OTC Vit D 2000IU QD    Screening for lipid disorders  -     Lipid panel; Future    Avulsion fracture of distal fibula  - advised to cont to wear her CAM walker boot  - has f/u with Ortho schedules for 3/2/2021    Pneumococcal vaccination declined by patient  Other orders  -     Cancel: PNEUMOCOCCAL POLYSACCHARIDE VACCINE 23-VALENT =>1YO SQ IM          Subjective:    Patient ID: Kassandra Tran is a 56 y o  female  63yo F presents to the office for BP f/u   - established care on 2/9/2021 and was started on ACEI 10mg QD for elevated pressures - tolerating it well w/o ADRs   - BP in the office 148/90 and on my repeat 144/90  - denies F/C/N/V/HA/CP/SOB/wheezing/cough/abd pain/D/C/LE edema   - denies change in vision  - (+) intermittent palpitations   - (+) drinks 24oz of coffee/day - has been trying to cut down to 1/2   - (+) drinks 4glasses of wine or 2 [place] QD -- trying to cut back as has begun to feel guilty re EtOH consumption   - (+) current smoker (2-4cigs/day) - trying to stop   - (+) FHx of MI in Father and PGF btw 60-64yo   - nml renal function and urine microalbumin   2) Depression   - PHQ-9 score of 6 <-- 5 at last OV 2wks ago   - a horse (of 21yrs), 2 dogs and kitten passed away recently   - has had thoughts of death/dying but no plan  - has begun to affect her marriage   - nml TSH   - (+) Vit D deficiency        The following portions of the patient's history were reviewed and updated as appropriate: allergies, current medications, past family history, past medical history, past social history, past surgical history and problem list     Review of Systems  as per HPI    Objective:  /90 (BP Location: Left arm, Patient Position: Sitting, Cuff Size: Standard)   Pulse 103   Resp 16   Ht 5' 5" (1 651 m)   Wt 57 2 kg (126 lb)   SpO2 99%   BMI 20 97 kg/m²    Physical Exam  Vitals signs reviewed  Constitutional:       General: She is not in acute distress  Appearance: Normal appearance  She is normal weight  She is not ill-appearing, toxic-appearing or diaphoretic  HENT:      Head: Normocephalic and atraumatic  Right Ear: External ear normal       Left Ear: External ear normal    Eyes:      General: No scleral icterus  Right eye: No discharge  Left eye: No discharge  Extraocular Movements: Extraocular movements intact        Conjunctiva/sclera: Conjunctivae normal  Neck:      Musculoskeletal: Normal range of motion and neck supple  Cardiovascular:      Rate and Rhythm: Normal rate and regular rhythm  Heart sounds: Normal heart sounds  No murmur  No friction rub  No gallop  Pulmonary:      Effort: Pulmonary effort is normal  No respiratory distress  Breath sounds: Normal breath sounds  No stridor  No wheezing, rhonchi or rales  Abdominal:      General: Abdomen is flat  Bowel sounds are normal  There is no distension  Palpations: Abdomen is soft  There is no mass  Tenderness: There is no abdominal tenderness  There is no guarding or rebound  Musculoskeletal: Normal range of motion  Comments: Not wearing Cam walker boot    Skin:     General: Skin is warm and dry  Neurological:      General: No focal deficit present  Mental Status: She is alert and oriented to person, place, and time  Psychiatric:         Attention and Perception: Attention and perception normal          Mood and Affect: Mood is depressed  Speech: Speech normal  She is communicative  Behavior: Behavior normal  Behavior is cooperative  Thought Content: Thought content normal  Thought content does not include homicidal or suicidal ideation  Thought content does not include homicidal or suicidal plan  Cognition and Memory: Cognition normal          Judgment: Judgment normal       Comments: PHQ-9 score of 6          Depression Screening Follow-up Plan: Patient's depression screening was positive with a PHQ-2 score of 3  Their PHQ-9 score was 6  Patient assessed for underlying major depression  They have no active suicidal ideations  Brief counseling provided and recommend additional follow-up/re-evaluation next office visit  Continue regular follow-up with their psychologist/therapist/psychiatrist who is managing their mental health condition(s)

## 2021-02-25 ENCOUNTER — TELEPHONE (OUTPATIENT)
Dept: FAMILY MEDICINE CLINIC | Facility: CLINIC | Age: 61
End: 2021-02-25

## 2021-02-26 DIAGNOSIS — F34.1 DYSTHYMIA: Primary | ICD-10-CM

## 2021-02-26 RX ORDER — ESCITALOPRAM OXALATE 10 MG/1
10 TABLET ORAL DAILY
Qty: 30 TABLET | Refills: 1 | Status: SHIPPED | OUTPATIENT
Start: 2021-02-26 | End: 2021-03-26

## 2021-02-26 NOTE — TELEPHONE ENCOUNTER
There was a misunderstanding with the message left  Speaking with patient she states she has not been on lexapro before but it is something you offered for her to try at her appointment on 2/24/2021  She decided she wants to try this and would like you to sent to pharmacy!

## 2021-02-26 NOTE — TELEPHONE ENCOUNTER
KEE for pt - had started her on Zoloft but will d/c that medication and new eRx for Lexapro 10mg QD sent to pharmacy on file

## 2021-03-02 ENCOUNTER — APPOINTMENT (OUTPATIENT)
Dept: RADIOLOGY | Facility: CLINIC | Age: 61
End: 2021-03-02
Payer: COMMERCIAL

## 2021-03-02 ENCOUNTER — OFFICE VISIT (OUTPATIENT)
Dept: OBGYN CLINIC | Facility: CLINIC | Age: 61
End: 2021-03-02

## 2021-03-02 VITALS
HEIGHT: 65 IN | HEART RATE: 87 BPM | SYSTOLIC BLOOD PRESSURE: 180 MMHG | WEIGHT: 126 LBS | BODY MASS INDEX: 20.99 KG/M2 | DIASTOLIC BLOOD PRESSURE: 127 MMHG

## 2021-03-02 DIAGNOSIS — S82.392A CLOSED FRACTURE OF POSTERIOR MALLEOLUS OF LEFT TIBIA, INITIAL ENCOUNTER: ICD-10-CM

## 2021-03-02 DIAGNOSIS — S82.839A AVULSION FRACTURE OF DISTAL FIBULA: Primary | ICD-10-CM

## 2021-03-02 DIAGNOSIS — S82.839A AVULSION FRACTURE OF DISTAL FIBULA: ICD-10-CM

## 2021-03-02 PROCEDURE — 99024 POSTOP FOLLOW-UP VISIT: CPT | Performed by: ORTHOPAEDIC SURGERY

## 2021-03-02 PROCEDURE — 73610 X-RAY EXAM OF ANKLE: CPT

## 2021-03-02 NOTE — PROGRESS NOTES
Assessment/Plan:  1  Avulsion fracture of distal fibula  XR ankle 3+ vw left   2  Closed fracture of posterior malleolus of left tibia, initial encounter  XR ankle 3+ vw left         Bath improvement in both avulsion fractures in her left ankle  Her x-rays show stable healing fractures and she has only minimal pain on examination today  She can certainly come out of the boot and continue with ambulation as tolerated  She should continue to heal over the next several weeks  She was advised to follow up if the pain persists or worsens  Subjective:   Avinash Aguayo is a 56 y o  female who presents For follow-up for avulsion fractures in the distal fibula and posterior malleolus which occurred 4 weeks ago  We last saw her 3 weeks ago and placed her in a Cam walker boot  She states her ankle is feeling much better and she has not been wearing the boot in the last 2 weeks  She has some mild discomfort over aspect left ankle and no pain posteriorly  Her pain and swelling have significantly improved she has only mild discomfort walking           Past Medical History:   Diagnosis Date    Hypoglycemia        Past Surgical History:   Procedure Laterality Date    WISDOM TOOTH EXTRACTION         Family History   Problem Relation Age of Onset    Breast cancer Mother 67    Heart attack Father 61    Heart attack Paternal Grandfather 61    Diabetes Neg Hx     Hyperlipidemia Neg Hx     Hypertension Neg Hx        Social History     Occupational History    Not on file   Tobacco Use    Smoking status: Current Every Day Smoker     Packs/day: 0 25     Years: 6 00     Pack years: 1 50    Smokeless tobacco: Former User    Tobacco comment: 2-4cigs/day   Substance and Sexual Activity    Alcohol use: Yes     Frequency: 4 or more times a week     Comment: 4 glasses of wine or 2 Manhattans/daily    Drug use: Never    Sexual activity: Yes     Partners: Male     Comment: declined STD screening in the office today Current Outpatient Medications:     Cholecalciferol 1 25 MG (15282 UT) TABS, Take 1 tablet (50,000 Units total) by mouth once a week x12wks and then switch to OTC Vit D 2000IU QD, Disp: 12 tablet, Rfl: 0    escitalopram (LEXAPRO) 10 mg tablet, Take 1 tablet (10 mg total) by mouth daily, Disp: 30 tablet, Rfl: 1    lisinopril (ZESTRIL) 20 mg tablet, Take 1 tablet (20 mg total) by mouth daily, Disp: 30 tablet, Rfl: 0    No Known Allergies    Objective:  Vitals:    03/02/21 1015   BP: (!) 180/127   Pulse: 87       Left Ankle Exam     Tenderness   Left ankle tenderness location: Mild discomfort over lateral malleolus  Range of Motion   Dorsiflexion: normal   Plantar flexion: normal   Eversion: normal   Inversion: normal     Muscle Strength   Dorsiflexion:  5/5   Plantar flexion:  5/5   Anterior tibial:  5/5   Posterior tibial:  5/5  Gastrocsoleus:  5/5  Peroneal muscle:  4/5    Other   Erythema: absent  Sensation: normal  Pulse: present          Strength/Myotome Testing     Left Ankle/Foot   Dorsiflexion: 5  Plantar flexion: 5      Physical Exam  Vitals signs reviewed  Constitutional:       Appearance: She is well-developed  HENT:      Head: Normocephalic and atraumatic  Eyes:      Conjunctiva/sclera: Conjunctivae normal       Pupils: Pupils are equal, round, and reactive to light  Neck:      Musculoskeletal: Normal range of motion and neck supple  Cardiovascular:      Rate and Rhythm: Normal rate  Pulmonary:      Effort: Pulmonary effort is normal  No respiratory distress  Skin:     General: Skin is warm and dry  Neurological:      Mental Status: She is alert and oriented to person, place, and time  Psychiatric:         Behavior: Behavior normal          I have personally reviewed pertinent films in PACS and my interpretation is as follows:     Three-view x-rays of the left ankle demonstrate healing avulsion fractures of the lateral posterior malleolus

## 2021-03-20 DIAGNOSIS — I10 ESSENTIAL HYPERTENSION: ICD-10-CM

## 2021-03-24 RX ORDER — LISINOPRIL 20 MG/1
TABLET ORAL
Qty: 30 TABLET | Refills: 0 | Status: SHIPPED | OUTPATIENT
Start: 2021-03-24 | End: 2021-03-26 | Stop reason: SDUPTHER

## 2021-03-26 ENCOUNTER — OFFICE VISIT (OUTPATIENT)
Dept: FAMILY MEDICINE CLINIC | Facility: CLINIC | Age: 61
End: 2021-03-26
Payer: COMMERCIAL

## 2021-03-26 VITALS
SYSTOLIC BLOOD PRESSURE: 160 MMHG | HEART RATE: 87 BPM | RESPIRATION RATE: 16 BRPM | DIASTOLIC BLOOD PRESSURE: 100 MMHG | HEIGHT: 66 IN | BODY MASS INDEX: 19.77 KG/M2 | WEIGHT: 123 LBS | OXYGEN SATURATION: 97 %

## 2021-03-26 DIAGNOSIS — Z78.9 EXCESSIVE CAFFEINE INTAKE: ICD-10-CM

## 2021-03-26 DIAGNOSIS — Z78.9 HEAVY DRINKER OF ALCOHOL: ICD-10-CM

## 2021-03-26 DIAGNOSIS — Z12.4 CERVICAL CANCER SCREENING: ICD-10-CM

## 2021-03-26 DIAGNOSIS — Z82.49 FAMILY HISTORY OF HEART ATTACK: ICD-10-CM

## 2021-03-26 DIAGNOSIS — J31.2 CHRONIC SORE THROAT: ICD-10-CM

## 2021-03-26 DIAGNOSIS — S82.839A AVULSION FRACTURE OF DISTAL FIBULA: ICD-10-CM

## 2021-03-26 DIAGNOSIS — F34.1 DYSTHYMIA: ICD-10-CM

## 2021-03-26 DIAGNOSIS — I10 ESSENTIAL HYPERTENSION: Primary | ICD-10-CM

## 2021-03-26 DIAGNOSIS — E55.9 VITAMIN D DEFICIENCY: ICD-10-CM

## 2021-03-26 DIAGNOSIS — F17.200 CURRENT EVERY DAY SMOKER: ICD-10-CM

## 2021-03-26 PROCEDURE — 99214 OFFICE O/P EST MOD 30 MIN: CPT | Performed by: FAMILY MEDICINE

## 2021-03-26 PROCEDURE — 3080F DIAST BP >= 90 MM HG: CPT | Performed by: FAMILY MEDICINE

## 2021-03-26 PROCEDURE — 3008F BODY MASS INDEX DOCD: CPT | Performed by: FAMILY MEDICINE

## 2021-03-26 PROCEDURE — 3077F SYST BP >= 140 MM HG: CPT | Performed by: FAMILY MEDICINE

## 2021-03-26 PROCEDURE — 4004F PT TOBACCO SCREEN RCVD TLK: CPT | Performed by: FAMILY MEDICINE

## 2021-03-26 RX ORDER — AMLODIPINE BESYLATE 10 MG/1
10 TABLET ORAL DAILY
Qty: 30 TABLET | Refills: 0 | Status: SHIPPED | OUTPATIENT
Start: 2021-03-26 | End: 2021-04-09 | Stop reason: SDUPTHER

## 2021-03-26 RX ORDER — LISINOPRIL 20 MG/1
20 TABLET ORAL DAILY
Qty: 30 TABLET | Refills: 0 | Status: SHIPPED | OUTPATIENT
Start: 2021-03-26 | End: 2021-04-09 | Stop reason: SDUPTHER

## 2021-03-26 NOTE — PATIENT INSTRUCTIONS
Hypertension, Ambulatory Care   GENERAL INFORMATION:   Hypertension  is high blood pressure (BP)  Your BP is the force of your blood moving against the walls of your arteries  Hypertension is a BP of 140/90 or higher  Hypertension causes your BP to get so high that your heart has to work much harder than normal  This can cause damage to your heart  Common symptoms include the following:   · Headache     · Blurred vision     · Chest pain     · Dizziness or weakness     · Trouble breathing    · Nosebleeds  Seek immediate care for the following symptoms:   · Severe headache or vision loss    · Weakness in an arm or leg    · Confusion or difficulty speaking    · Discomfort in your chest that feels like squeezing, pressure, fullness, or pain    · Suddenly feeling lightheaded or trouble breathing    · Pain or discomfort in your back, neck, jaw, stomach, or arm  Treatment for hypertension  may include medicine to lower your BP  You may also need to make lifestyle changes  Take your medicine exactly as directed  Manage hypertension:   · Take your BP at home  Sit and rest for 5 minutes before you take your BP  Extend your arm and support it on a flat surface  Your arm should be at the same level as your heart  Follow the directions that came with your BP monitor  If possible, take at least 2 BP readings each time  Take your BP at least twice a day at the same times each day, such as morning and evening  Keep a log of your BP readings and bring it to your follow-up visits  · Eat less sodium (salt)  Do not add sodium to your food  Limit foods that are high in sodium, such as canned foods, potato chips, and cold cuts  Your healthcare provider may suggest that you follow the [address] [place] [address]  The plan is low in sodium, unhealthy fats, and total fat  It is high in potassium, calcium, and fiber  · Exercise regularly  Exercise at least 30 minutes per day, on most days of the week  This will help decrease your BP  Ask your healthcare provider about the best exercise plan for you  · Limit alcohol  Women should limit alcohol to 1 drink a day  Men should limit alcohol to 2 drinks a day  A drink of alcohol is 12 ounces of beer, 5 ounces of wine, or 1½ ounces of liquor  · Do not smoke  If you smoke, it is never too late to quit  Smoking can increase your BP  Smoking also worsens other health conditions you may have that can increase your risk for hypertension  Ask your healthcare provider for information if you need help quitting  Follow up with your healthcare provider as directed: You will need to return to have your BP checked and to have other lab tests done  Write down your questions so you remember to ask them during your visits  CARE AGREEMENT:   You have the right to help plan your care  Learn about your health condition and how it may be treated  Discuss treatment options with your caregivers to decide what care you want to receive  You always have the right to refuse treatment  The above information is an  only  It is not intended as medical advice for individual conditions or treatments  Talk to your doctor, nurse or pharmacist before following any medical regimen to see if it is safe and effective for you  © 2014 [address] is for End User's use only and may not be sold, redistributed or otherwise used for commercial purposes  All illustrations and images included in CareNotes® are the copyrighted property of A D A M [employer]  or Rohit Mcpherson

## 2021-03-26 NOTE — PROGRESS NOTES
Assessment/Plan:   Diagnoses and all orders for this visit:    Essential hypertension  -     Ambulatory referral to Cardiology; Future  -     lisinopril (ZESTRIL) 20 mg tablet; Take 1 tablet (20 mg total) by mouth daily  -     amLODIPine (NORVASC) 10 mg tablet; Take 1 tablet (10 mg total) by mouth daily  - established care on 2/9/2021 and was started on ACEI 10mg QD for elevated pressures   - was seen in the office on 2/24/2021 and ACEI was increased to 20mg QD   - BP in the office 180/120 and on my repeat 160/100   - (+) intermittent palpitations/"pounding"  - (+) drinks 24oz of coffee/day - has cut down to 8oz/day and sometimes drinks green-tea   - (+) drinks 4glasses of wine or 2 [place]'s QD -- advised to stop   - (+) current smoker (2-6cigs/day) - trying to stop   - (+) FHx of MI in Father and PGF btw 60-64yo   - nml renal function and urine microalbumin  - no Lipid Panel   - cont ACEI 20mg QD and will add CCB 10mg QD - RTO in 2wks for BP check   - advised to STOP drinking EtOH/caffeine and STOP smoking   - check lipids   - referred to Cardio   Family history of heart attack  -     Lipid panel;  Future  Heavy drinker of alcohol  Current every day smoker  Excessive caffeine intake    Chronic sore throat  - no cold s/s   - could this be esophagitis 2/2 EtOH consumption - pt has been referred to GI     Dysthymia  - was started on SSRI but pt stopped taking after 2days   - nml TSH   - (+) Vit D deficiency - has been taking OTC supplements - will eRx 50,000IU Qwk x12 wks to boost levels   - would like to hold off on SSRI right now and try natural remedies   - a horse (of 21yrs), 2 dogs and kitten passed away recently   - has had thoughts of death/dying but no plan  - has begun to affect her marriage   - re-evaluate at next OV in 2wks     Vitamin D deficiency  -     Cholecalciferol 1 25 MG (81221 UT) TABS; Take 1 tablet (50,000 Units total) by mouth once a week x12wks and then switch to OTC Vit D 2000IU QD    Avulsion fracture of distal fibula  -     DXA bone density spine hip and pelvis; Future    Cervical cancer screening  -     Ambulatory referral to Gynecology; Future    Other orders  -     Cancel: HIV 1/2 Antigen/Antibody (4th Generation) w Reflex [facility]; Future  -     Cancel: Ambulatory referral to Gynecology; Future          Subjective:    Patient ID: Anders Cox is a 56 y o  female    HPI   63yo F presents to the office for BP f/u   - established care on 2/9/2021 and was started on ACEI 10mg QD for elevated pressures   - was seen in the office on 2/24/2021 and ACEI was increased to 20mg QD   - BP in the office 180/120 and on my repeat 160/100   - denies F/C/N/V/HA/CP/SOB/wheezing/cough/abd pain/D/C/LE edema   - denies change in vision  - (+) intermittent palpitations/"pounding"  - (+) drinks 24oz of coffee/day - has cut down to 8oz/day and sometimes drinks green-tea   - (+) drinks 4glasses of wine or 2 [place]'s QD -- trying to cut back as has begun to feel guilty re EtOH consumption   - (+) current smoker (2-6cigs/day) - trying to stop   - (+) FHx of MI in Father and PGF btw 60-62yo   - nml renal function and urine microalbumin   - no Lipid Panel   2) Depression   - was started on SSRI but pt stopped taking after 2days   - nml TSH   - (+) Vit D deficiency - has been taking OTC supplements   - would like to hold off on SSRI right now and try natural remedies   - a horse (of 21yrs), 2 dogs and kitten passed away recently   - has had thoughts of death/dying but no plan  - has begun to affect her marriage       The following portions of the patient's history were reviewed and updated as appropriate: allergies, current medications, past family history, past medical history, past social history, past surgical history and problem list     Review of Systems  as per HPI    Objective:  /100 (BP Location: Right arm, Patient Position: Sitting, Cuff Size: Standard)   Pulse 87   Resp 16   Ht 5' 6" (1 676 m) Wt 55 8 kg (123 lb)   SpO2 97%   BMI 19 85 kg/m²    Physical Exam  Vitals signs reviewed  Constitutional:       General: She is not in acute distress  Appearance: Normal appearance  She is normal weight  She is not ill-appearing, toxic-appearing or diaphoretic  HENT:      Head: Normocephalic and atraumatic  Right Ear: External ear normal       Left Ear: External ear normal    Eyes:      General: No scleral icterus  Right eye: No discharge  Left eye: No discharge  Extraocular Movements: Extraocular movements intact  Conjunctiva/sclera: Conjunctivae normal    Neck:      Musculoskeletal: Normal range of motion and neck supple  Cardiovascular:      Rate and Rhythm: Normal rate and regular rhythm  Heart sounds: Normal heart sounds  No murmur  No friction rub  No gallop  Pulmonary:      Effort: Pulmonary effort is normal  No respiratory distress  Breath sounds: Normal breath sounds  No stridor  No wheezing, rhonchi or rales  Abdominal:      General: Bowel sounds are normal  There is no distension  Palpations: Abdomen is soft  There is no mass  Tenderness: There is no abdominal tenderness  There is no guarding or rebound  Musculoskeletal: Normal range of motion  General: No swelling, tenderness, deformity or signs of injury  Right lower leg: No edema  Left lower leg: No edema  Skin:     General: Skin is warm  Neurological:      General: No focal deficit present  Mental Status: She is alert and oriented to person, place, and time     Psychiatric:         Mood and Affect: Mood normal          Behavior: Behavior normal

## 2021-04-05 ENCOUNTER — APPOINTMENT (OUTPATIENT)
Dept: LAB | Facility: CLINIC | Age: 61
End: 2021-04-05
Payer: COMMERCIAL

## 2021-04-05 ENCOUNTER — TRANSCRIBE ORDERS (OUTPATIENT)
Dept: LAB | Facility: CLINIC | Age: 61
End: 2021-04-05

## 2021-04-05 DIAGNOSIS — Z13.220 SCREENING FOR LIPID DISORDERS: ICD-10-CM

## 2021-04-05 DIAGNOSIS — Z82.49 FAMILY HISTORY OF HEART ATTACK: ICD-10-CM

## 2021-04-05 LAB
CHOLEST SERPL-MCNC: 280 MG/DL (ref 50–200)
HDLC SERPL-MCNC: 114 MG/DL
LDLC SERPL CALC-MCNC: 148 MG/DL (ref 0–100)
NONHDLC SERPL-MCNC: 166 MG/DL
TRIGL SERPL-MCNC: 90 MG/DL

## 2021-04-05 PROCEDURE — 80061 LIPID PANEL: CPT

## 2021-04-05 PROCEDURE — 36415 COLL VENOUS BLD VENIPUNCTURE: CPT

## 2021-04-07 RX ORDER — CHOLECALCIFEROL (VITAMIN D3) 1250 MCG
CAPSULE ORAL
COMMUNITY
Start: 2021-03-26 | End: 2021-04-09

## 2021-04-09 ENCOUNTER — OFFICE VISIT (OUTPATIENT)
Dept: FAMILY MEDICINE CLINIC | Facility: CLINIC | Age: 61
End: 2021-04-09
Payer: COMMERCIAL

## 2021-04-09 VITALS
BODY MASS INDEX: 19.77 KG/M2 | HEIGHT: 66 IN | SYSTOLIC BLOOD PRESSURE: 124 MMHG | HEART RATE: 94 BPM | RESPIRATION RATE: 16 BRPM | OXYGEN SATURATION: 99 % | DIASTOLIC BLOOD PRESSURE: 78 MMHG | WEIGHT: 123 LBS

## 2021-04-09 DIAGNOSIS — Z78.9 EXCESSIVE CAFFEINE INTAKE: ICD-10-CM

## 2021-04-09 DIAGNOSIS — E78.00 ELEVATED LDL CHOLESTEROL LEVEL: ICD-10-CM

## 2021-04-09 DIAGNOSIS — Z12.4 CERVICAL CANCER SCREENING: ICD-10-CM

## 2021-04-09 DIAGNOSIS — J31.2 CHRONIC SORE THROAT: ICD-10-CM

## 2021-04-09 DIAGNOSIS — Z82.49 FAMILY HISTORY OF HEART ATTACK: ICD-10-CM

## 2021-04-09 DIAGNOSIS — I10 ESSENTIAL HYPERTENSION: ICD-10-CM

## 2021-04-09 DIAGNOSIS — R07.89 FEELING OF CHEST TIGHTNESS: Primary | ICD-10-CM

## 2021-04-09 DIAGNOSIS — Z78.9 HEAVY DRINKER OF ALCOHOL: ICD-10-CM

## 2021-04-09 DIAGNOSIS — E55.9 VITAMIN D DEFICIENCY: ICD-10-CM

## 2021-04-09 DIAGNOSIS — F32.0 CURRENT MILD EPISODE OF MAJOR DEPRESSIVE DISORDER WITHOUT PRIOR EPISODE (HCC): ICD-10-CM

## 2021-04-09 DIAGNOSIS — F17.200 CURRENT EVERY DAY SMOKER: ICD-10-CM

## 2021-04-09 PROCEDURE — 99215 OFFICE O/P EST HI 40 MIN: CPT | Performed by: FAMILY MEDICINE

## 2021-04-09 PROCEDURE — 93000 ELECTROCARDIOGRAM COMPLETE: CPT | Performed by: FAMILY MEDICINE

## 2021-04-09 PROCEDURE — 3725F SCREEN DEPRESSION PERFORMED: CPT | Performed by: FAMILY MEDICINE

## 2021-04-09 RX ORDER — AMLODIPINE BESYLATE 10 MG/1
10 TABLET ORAL DAILY
Qty: 90 TABLET | Refills: 0 | Status: SHIPPED | OUTPATIENT
Start: 2021-04-09 | End: 2021-06-04 | Stop reason: SDUPTHER

## 2021-04-09 RX ORDER — LISINOPRIL 20 MG/1
20 TABLET ORAL DAILY
Qty: 90 TABLET | Refills: 0 | Status: SHIPPED | OUTPATIENT
Start: 2021-04-09 | End: 2021-07-14

## 2021-04-09 RX ORDER — ROSUVASTATIN CALCIUM 5 MG/1
5 TABLET, COATED ORAL DAILY
Qty: 30 TABLET | Refills: 0 | Status: SHIPPED | OUTPATIENT
Start: 2021-04-09 | End: 2021-05-12

## 2021-04-09 NOTE — PROGRESS NOTES
Assessment/Plan:   Diagnoses and all orders for this visit:    Feeling of chest tightness  -     POCT ECG - NSR at 84bpm, no ST-T wave changes   Essential hypertension  -     amLODIPine (NORVASC) 10 mg tablet; Take 1 tablet (10 mg total) by mouth daily  -     lisinopril (ZESTRIL) 20 mg tablet; Take 1 tablet (20 mg total) by mouth daily  - BP in the office 118/70 and on my repeat 124/78   - currently on ACEI 20mg QD and CCB 10mg QD and tolerating it well   - (+) chest tightness which started yesterday and intermittent "pounding"  - EKG as noted above   - has cut down to 8oz caffeine/day and sometimes drinks green-tea   - (+) now drinks 3-4glasses of [place] QD or 2 light beer QD - advised cessation   - (+) current smoker (4cigs/day) - advised cessation    - (+) FHx of MI in Father and PGF btw 60-62yo   - nml renal function and urine microalbumin  - Lipids (4/5/2021): , TG 90, ,  - will start on Crestor 5mg QHS   - cont ACEI 20mg QD and CCB 10mg QD - RTO in 1month for BP check   - advised to STOP drinking EtOH and STOP smoking   - referred to Cardio - pt has an appt with Dr Dwain Laureano scheduled for 6/4/2021   Family history of heart attack  Current every day smoker  Heavy drinker of alcohol  Excessive caffeine intake    Elevated LDL cholesterol level  -     rosuvastatin (CRESTOR) 5 mg tablet; Take 1 tablet (5 mg total) by mouth daily  - Lipids (4/5/2021): , TG 90, ,    - given pt's PMHx and FHx - will start on Crestor 5mg QHS   - repeat labs in 3months     Chronic sore throat  - per pt has gotten better with decreased EtOH consumption     Current mild episode of major depressive disorder without prior episode (HCC)  - PHQ-9 score of 6 in the office today   - was started on SSRI on 2/24/2021 but pt stopped taking after 2days   - nml TSH   - (+) Vit D deficiency - eRx'd 50,000IU Qwk x12 wks to boost levels - pt took over 12days vs 12wks     - would like to hold off on SSRI right now and try natural remedies   - amenable to talking to a therapist - has been referred   - a horse (of 21yrs), 2 dogs and kitten passed away recently   - has had thoughts of death/dying but no plan  - has begun to affect her marriage   - re-evaluate at next OV in 1month   Vitamin D deficiency    Cervical cancer screening  - has an appt scheduled with Ob/Gyn for 4/29/2021    Other orders  -     Discontinue: Cholecalciferol (Vitamin D3) 1 25 MG (56163 UT) CAPS; PLEASE SEE ATTACHED FOR DETAILED DIRECTIONS  -     Cancel: Ambulatory referral to Gynecology; Future          Subjective:    Patient ID: Laura Elias is a 56 y o  female    61yo F presents to the office for BP f/u   1) HTN   - established care on 2/9/2021 and was started on ACEI 10mg QD for elevated pressures   - was seen in the office on 2/24/2021 and ACEI was increased to 20mg QD   - CCB 10mg QD was added on 3/26/2021  - BP in the office 118/70 and on my repeat 124/78   - feeling "irritable" and with chest tightness which started last night   - (+) intermittent "pounding"   - denies F/C/N/V/change in vision/HA/CP/SOB/wheezing/cough/abd pain/D/C/LE edema   - (+) drinks 24oz of coffee/day - has cut down to 8oz/day and sometimes drinks green-tea   - (+) drinks 4glasses of wine or 2 [place]'s QD -- now drinks 3-4glasses of [place] QD or 2 light beer QD - does not get hangovers   - (+) current smoker (4cigs/day) - trying to stop   - (+) FHx of MI in Father and PGF btw 60-64yo   - nml renal function and urine microalbumin   - Lipids (4/5/2021): , TG 90, ,    2) Depression/Vit D deficiency   - PHQ-9 score of 6 in the office today  - was started on SSRI but pt stopped taking after 2days   - nml TSH   - (+) Vit D deficiency - took her Qwkly Vit D tabs QD   - would like to hold off on SSRI right now and try natural remedies   - a horse (of 21yrs), 2 dogs and kitten passed away recently   - has had thoughts of death/dying but no plan  - has begun to affect her marriage       The following portions of the patient's history were reviewed and updated as appropriate: allergies, current medications, past family history, past medical history, past social history, past surgical history and problem list     Review of Systems  as per HPI    Objective:  /78 (BP Location: Right arm, Patient Position: Sitting, Cuff Size: Standard)   Pulse 94   Resp 16   Ht 5' 6" (1 676 m)   Wt 55 8 kg (123 lb)   SpO2 99%   BMI 19 85 kg/m²    Physical Exam  Vitals signs reviewed  Constitutional:       General: She is not in acute distress  Appearance: Normal appearance  She is normal weight  She is not ill-appearing, toxic-appearing or diaphoretic  HENT:      Head: Normocephalic and atraumatic  Right Ear: External ear normal       Left Ear: External ear normal    Eyes:      General: No scleral icterus  Right eye: No discharge  Left eye: No discharge  Extraocular Movements: Extraocular movements intact  Conjunctiva/sclera: Conjunctivae normal    Neck:      Musculoskeletal: Normal range of motion  Cardiovascular:      Rate and Rhythm: Normal rate and regular rhythm  Heart sounds: Normal heart sounds  No murmur  No friction rub  No gallop  Pulmonary:      Effort: Pulmonary effort is normal  No respiratory distress  Breath sounds: Normal breath sounds  No stridor  No wheezing, rhonchi or rales  Chest:      Chest wall: No tenderness  Abdominal:      General: Abdomen is flat  Bowel sounds are normal  There is no distension  Palpations: Abdomen is soft  There is no mass  Tenderness: There is no abdominal tenderness  There is no guarding or rebound  Hernia: No hernia is present  Musculoskeletal: Normal range of motion  Right lower leg: No edema  Left lower leg: No edema  Skin:     General: Skin is warm  Neurological:      General: No focal deficit present        Mental Status: She is alert and oriented to person, place, and time  Psychiatric:         Attention and Perception: Attention normal          Mood and Affect: Affect normal  Mood is depressed  Speech: Speech normal  She is communicative  Behavior: Behavior normal  Behavior is cooperative  Thought Content: Thought content normal  Thought content does not include homicidal or suicidal ideation  Thought content does not include homicidal or suicidal plan  Judgment: Judgment normal        Depression Screening Follow-up Plan: Patient's depression screening was positive with a PHQ-2 score of 4  Their PHQ-9 score was 6  Patient declines further evaluation by mental health professional and/or medications  They have no active suicidal ideations  Brief counseling provided and recommend additional follow-up/re-evaluation at next office visit  I have spent 50 minutes with Patient  today in which greater than 50% of this time was spent in counseling/coordination of care regarding Diagnostic results, Prognosis, Risks and benefits of tx options, Intructions for management, Patient and family education, Importance of tx compliance, Risk factor reductions and Impressions

## 2021-04-28 NOTE — PROGRESS NOTES
Assessment/Plan:    Calcium 1200-1500mg + 600-1000 IU Vit D daily  Exercise 150 minutes per week minimum including weight bearing exercises  Pap with high risk HPV Q 5 years, if normal  Done today  Annual mammogram (ordered by PCP) and monthly breast self exam recommended  Colonoscopy-Referred to Dr Blayne Hood 20 times twice daily  Silicone based lubricant with sex  (Use water based lubricant with condoms or sexual toys )  Vaginal moisturizers twice weekly as needed  Follow up with PCP for Vit D deficiency   Start vaginal estrogen as discussed  Aware of benefits, risks and alternatives  Acknowledge that this is a change in how she has felt in the past regarding sex  Attempt to limit anxiety related to this change  Continue to have intimate moments with   Change sexual script  Include use of other material to stimulate sexy thoughts  Stimulate 5 senses during sex  Practice self care-exercise, eating well, stay hydrated and rest  Consider smoking cessation (referred to program) and decreasing alcohol ingestion  Call PCP with any worsening of depression  Discussed alcohols possible role in this  Diagnoses and all orders for this visit:    Encntr for gyn exam (general) (routine) w/o abn findings  -     Liquid-based pap, screening    Encounter for Papanicolaou smear for cervical cancer screening  -     Liquid-based pap, screening    Vaginal atrophy  -     estradiol (ESTRACE) 0 1 mg/g vaginal cream; Insert 0 5 gm intravaginally HS x 14 night then 0 5 gm twice weekly    Colon cancer screening  -     Ambulatory referral to Gastroenterology; Future    Cigarette nicotine dependence without complication  -     Ambulatory referral to Smoking Cessation Program; Future    Low libido    Other orders  -     Cancel: Mammo screening bilateral w 3d & cad; Future  -     escitalopram (LEXAPRO) 10 mg tablet; Take 10 mg by mouth daily          Subjective:      Patient ID: Gary Martini is a 56 y o  female  Jessica Chávez is a 56 y o  female who is here today as a new patient  for her annual visit  No health concerns  Last gyn exam was about 20 years ago  LMP in her 50's with no vaginal bleeding since  She is not currently exercising  She rode her horse x 21 years until she recently  but is hoping to get back to riding again  Jessica Chávez is sexually active with  of 15 years (he is 15 years younger)  She admits to decreased sexual desire over the last year which is discordant with her husbands desires  He is supportive but frustrated at times  Smoking 6 cigarettes per day  Considering quitting  She also admits to drinking 3 glasses of wine per day which she is considering cutting back as it doesn't make her feel well  Slight depression with pandemic, loss of her horse  No suicidal ideation  The following portions of the patient's history were reviewed and updated as appropriate: allergies, current medications, past family history, past medical history, past social history, past surgical history and problem list     Review of Systems   Constitutional: Negative  Negative for activity change, appetite change, chills, diaphoresis, fatigue, fever and unexpected weight change  HENT: Negative for congestion, dental problem, sneezing, sore throat and trouble swallowing  Eyes: Negative for visual disturbance  Respiratory: Negative for chest tightness and shortness of breath  Cardiovascular: Negative for chest pain and leg swelling  Gastrointestinal: Negative for abdominal pain, constipation, diarrhea, nausea and vomiting  Genitourinary: Positive for dyspareunia (must use lubricant )  Negative for difficulty urinating, dysuria, frequency, hematuria, pelvic pain, urgency, vaginal bleeding, vaginal discharge and vaginal pain  Musculoskeletal: Negative for back pain and neck pain  Skin: Negative  Allergic/Immunologic: Negative      Neurological: Negative for weakness and headaches  Hematological: Negative for adenopathy  Psychiatric/Behavioral: Negative for self-injury and suicidal ideas  Slight depression         Objective:      /80   Pulse 64   Ht 5' 4 25" (1 632 m)   Wt 53 5 kg (118 lb)   Breastfeeding No   BMI 20 10 kg/m²          Physical Exam  Vitals signs and nursing note reviewed  Constitutional:       Appearance: Normal appearance  She is well-developed  HENT:      Head: Normocephalic and atraumatic  Eyes:      General:         Right eye: No discharge  Left eye: No discharge  Neck:      Musculoskeletal: Normal range of motion and neck supple  Thyroid: No thyromegaly  Trachea: Trachea normal    Cardiovascular:      Rate and Rhythm: Normal rate and regular rhythm  Heart sounds: Normal heart sounds  Pulmonary:      Effort: Pulmonary effort is normal       Breath sounds: Normal breath sounds  Chest:      Breasts: Breasts are symmetrical          Right: Normal  No inverted nipple, mass, nipple discharge, skin change or tenderness  Left: Normal  No inverted nipple, mass, nipple discharge, skin change or tenderness  Abdominal:      Palpations: Abdomen is soft  Genitourinary:     Exam position: Lithotomy position  Labia:         Right: No rash, tenderness, lesion or injury  Left: No rash, tenderness, lesion or injury  Urethra: No prolapse, urethral pain, urethral swelling or urethral lesion  Vagina: No signs of injury and foreign body  No vaginal discharge, erythema, tenderness or bleeding  Cervix: No cervical motion tenderness, discharge or friability  Uterus: Normal        Adnexa:         Right: No mass, tenderness or fullness  Left: No mass, tenderness or fullness  Rectum: No external hemorrhoid  Comments: Vulvovaginal atrophy  Musculoskeletal: Normal range of motion     Lymphadenopathy:      Head:      Right side of head: No submental, submandibular or tonsillar adenopathy  Left side of head: No submental, submandibular or tonsillar adenopathy  Cervical: No cervical adenopathy  Upper Body:      Right upper body: No supraclavicular or axillary adenopathy  Left upper body: No supraclavicular or axillary adenopathy  Lower Body: No right inguinal adenopathy  No left inguinal adenopathy  Skin:     General: Skin is warm and dry  Neurological:      Mental Status: She is alert and oriented to person, place, and time     Psychiatric:         Mood and Affect: Mood normal          Behavior: Behavior normal

## 2021-04-29 ENCOUNTER — OFFICE VISIT (OUTPATIENT)
Dept: OBGYN CLINIC | Facility: CLINIC | Age: 61
End: 2021-04-29
Payer: COMMERCIAL

## 2021-04-29 VITALS
BODY MASS INDEX: 20.14 KG/M2 | WEIGHT: 118 LBS | DIASTOLIC BLOOD PRESSURE: 80 MMHG | HEIGHT: 64 IN | HEART RATE: 64 BPM | SYSTOLIC BLOOD PRESSURE: 126 MMHG

## 2021-04-29 DIAGNOSIS — F17.210 CIGARETTE NICOTINE DEPENDENCE WITHOUT COMPLICATION: ICD-10-CM

## 2021-04-29 DIAGNOSIS — Z12.4 ENCOUNTER FOR PAPANICOLAOU SMEAR FOR CERVICAL CANCER SCREENING: ICD-10-CM

## 2021-04-29 DIAGNOSIS — Z12.11 COLON CANCER SCREENING: ICD-10-CM

## 2021-04-29 DIAGNOSIS — R68.82 LOW LIBIDO: ICD-10-CM

## 2021-04-29 DIAGNOSIS — N95.2 VAGINAL ATROPHY: ICD-10-CM

## 2021-04-29 DIAGNOSIS — Z01.419 ENCNTR FOR GYN EXAM (GENERAL) (ROUTINE) W/O ABN FINDINGS: Primary | ICD-10-CM

## 2021-04-29 PROCEDURE — 3008F BODY MASS INDEX DOCD: CPT | Performed by: NURSE PRACTITIONER

## 2021-04-29 PROCEDURE — 4004F PT TOBACCO SCREEN RCVD TLK: CPT | Performed by: NURSE PRACTITIONER

## 2021-04-29 PROCEDURE — G0145 SCR C/V CYTO,THINLAYER,RESCR: HCPCS | Performed by: NURSE PRACTITIONER

## 2021-04-29 PROCEDURE — 87624 HPV HI-RISK TYP POOLED RSLT: CPT | Performed by: NURSE PRACTITIONER

## 2021-04-29 PROCEDURE — 99386 PREV VISIT NEW AGE 40-64: CPT | Performed by: NURSE PRACTITIONER

## 2021-04-29 PROCEDURE — 3079F DIAST BP 80-89 MM HG: CPT | Performed by: NURSE PRACTITIONER

## 2021-04-29 PROCEDURE — 3074F SYST BP LT 130 MM HG: CPT | Performed by: NURSE PRACTITIONER

## 2021-04-29 RX ORDER — ESCITALOPRAM OXALATE 10 MG/1
10 TABLET ORAL DAILY
COMMUNITY
End: 2021-05-19 | Stop reason: SDUPTHER

## 2021-04-29 RX ORDER — ESTRADIOL 0.1 MG/G
CREAM VAGINAL
Qty: 42.5 G | Refills: 1 | Status: SHIPPED | OUTPATIENT
Start: 2021-04-29

## 2021-04-29 NOTE — PATIENT INSTRUCTIONS
Calcium 1200-1500mg + 600-1000 IU Vit D daily  Exercise 150 minutes per week minimum including weight bearing exercises  Pap with high risk HPV Q 5 years, if normal  Done today  Annual mammogram (ordered by PCP) and monthly breast self exam recommended  Colonoscopy-Referred to Dr Ector Hood 20 times twice daily  Silicone based lubricant with sex  (Use water based lubricant with condoms or sexual toys )  Vaginal moisturizers twice weekly as needed  Follow up with PCP for Vit D deficiency   Start vaginal estrogen as discussed  Aware of benefits, risks and alternatives  Acknowledge that this is a change in how she has felt in the past regarding sex  Attempt to limit anxiety related to this change  Continue to have intimate moments with   Change sexual script  Include use of other material to stimulate sexy thoughts  Stimulate 5 senses during sex  Practice self care-exercise, eating well, stay hydrated and rest  Consider smoking cessation (referred to program) and decreasing alcohol ingestion

## 2021-05-01 ENCOUNTER — PREP FOR PROCEDURE (OUTPATIENT)
Dept: GASTROENTEROLOGY | Facility: CLINIC | Age: 61
End: 2021-05-01

## 2021-05-01 DIAGNOSIS — Z12.11 SPECIAL SCREENING FOR MALIGNANT NEOPLASMS, COLON: Primary | ICD-10-CM

## 2021-05-01 NOTE — TELEPHONE ENCOUNTER
OA colon scheduled for 6/28/21 with Dr Rosalia Manjarrez @ [facility]  Suprep instructions given verbally/mailed to pt

## 2021-05-03 LAB
HPV HR 12 DNA CVX QL NAA+PROBE: NEGATIVE
HPV16 DNA CVX QL NAA+PROBE: NEGATIVE
HPV18 DNA CVX QL NAA+PROBE: NEGATIVE

## 2021-05-03 RX ORDER — SODIUM, POTASSIUM,MAG SULFATES 17.5-3.13G
SOLUTION, RECONSTITUTED, ORAL ORAL
Qty: 354 ML | Refills: 0 | Status: SHIPPED | OUTPATIENT
Start: 2021-05-03 | End: 2021-11-10

## 2021-05-07 DIAGNOSIS — E78.00 ELEVATED LDL CHOLESTEROL LEVEL: ICD-10-CM

## 2021-05-07 LAB
LAB AP GYN PRIMARY INTERPRETATION: NORMAL
Lab: NORMAL

## 2021-05-12 ENCOUNTER — RA CDI HCC (OUTPATIENT)
Dept: OTHER | Facility: HOSPITAL | Age: 61
End: 2021-05-12

## 2021-05-12 RX ORDER — ROSUVASTATIN CALCIUM 5 MG/1
TABLET, COATED ORAL
Qty: 30 TABLET | Refills: 0 | Status: SHIPPED | OUTPATIENT
Start: 2021-05-12 | End: 2021-05-19 | Stop reason: SDUPTHER

## 2021-05-12 NOTE — PROGRESS NOTES
[facility]  coding opportunities          Chart reviewed, no opportunity found: CHART REVIEWED, NO OPPORTUNITY FOUND              Patients insurance company:  [id number] [facility] (Medicare Advantage and Commercial)

## 2021-05-13 DIAGNOSIS — F32.A DEPRESSION, UNSPECIFIED DEPRESSION TYPE: Primary | ICD-10-CM

## 2021-05-14 RX ORDER — ESCITALOPRAM OXALATE 10 MG/1
10 TABLET ORAL DAILY
Qty: 30 TABLET | Refills: 0 | OUTPATIENT
Start: 2021-05-14

## 2021-05-19 ENCOUNTER — OFFICE VISIT (OUTPATIENT)
Dept: FAMILY MEDICINE CLINIC | Facility: CLINIC | Age: 61
End: 2021-05-19
Payer: COMMERCIAL

## 2021-05-19 VITALS
HEART RATE: 96 BPM | RESPIRATION RATE: 16 BRPM | OXYGEN SATURATION: 98 % | WEIGHT: 120 LBS | BODY MASS INDEX: 20.49 KG/M2 | HEIGHT: 64 IN | SYSTOLIC BLOOD PRESSURE: 118 MMHG | DIASTOLIC BLOOD PRESSURE: 80 MMHG

## 2021-05-19 DIAGNOSIS — R05.9 COUGH: ICD-10-CM

## 2021-05-19 DIAGNOSIS — E78.00 ELEVATED LDL CHOLESTEROL LEVEL: ICD-10-CM

## 2021-05-19 DIAGNOSIS — Z78.9 EXCESSIVE CAFFEINE INTAKE: ICD-10-CM

## 2021-05-19 DIAGNOSIS — S82.839A AVULSION FRACTURE OF DISTAL FIBULA: ICD-10-CM

## 2021-05-19 DIAGNOSIS — R07.89 FEELING OF CHEST TIGHTNESS: ICD-10-CM

## 2021-05-19 DIAGNOSIS — F17.200 CURRENT EVERY DAY SMOKER: ICD-10-CM

## 2021-05-19 DIAGNOSIS — Z82.49 FAMILY HISTORY OF HEART ATTACK: ICD-10-CM

## 2021-05-19 DIAGNOSIS — F32.A ANXIETY AND DEPRESSION: ICD-10-CM

## 2021-05-19 DIAGNOSIS — I10 ESSENTIAL HYPERTENSION: Primary | ICD-10-CM

## 2021-05-19 DIAGNOSIS — F41.9 ANXIETY AND DEPRESSION: ICD-10-CM

## 2021-05-19 DIAGNOSIS — Z78.9 HEAVY DRINKER OF ALCOHOL: ICD-10-CM

## 2021-05-19 DIAGNOSIS — E55.9 VITAMIN D DEFICIENCY: ICD-10-CM

## 2021-05-19 PROCEDURE — 3074F SYST BP LT 130 MM HG: CPT | Performed by: FAMILY MEDICINE

## 2021-05-19 PROCEDURE — 3079F DIAST BP 80-89 MM HG: CPT | Performed by: FAMILY MEDICINE

## 2021-05-19 PROCEDURE — 99215 OFFICE O/P EST HI 40 MIN: CPT | Performed by: FAMILY MEDICINE

## 2021-05-19 RX ORDER — ROSUVASTATIN CALCIUM 5 MG/1
5 TABLET, COATED ORAL DAILY
Qty: 90 TABLET | Refills: 0 | Status: SHIPPED | OUTPATIENT
Start: 2021-05-19 | End: 2022-01-12 | Stop reason: SDUPTHER

## 2021-05-19 RX ORDER — ESCITALOPRAM OXALATE 10 MG/1
10 TABLET ORAL DAILY
Qty: 90 TABLET | Refills: 0 | Status: SHIPPED | OUTPATIENT
Start: 2021-05-19 | End: 2021-08-11

## 2021-05-19 NOTE — PROGRESS NOTES
Assessment/Plan:   Diagnoses and all orders for this visit:    Essential hypertension  - BP in the office 120/78 and 118/80 on repeat   - currently on ACEI 20mg QD and CCB 10mg QD and tolerating it well   - (+) intermittent chest tightness   - EKG from 4/9/2021- NSR at 84bpm, no ST-T wave changes   - has cut down to 10oz caffeine/day and sometimes drinks green-tea   - (+) now drinks 3-4glasses of [place] QD or 3 light beer QD - advised cessation   - (+) current smoker - 2cigs/day - advised cessation    - (+) FHx of MI in Father and PGF btw 60-62yo   - nml renal function and urine microalbumin  - Lipids (4/5/2021): , TG 90, ,  - was started on Crestor 5mg QHS at last OV but didn't start - strongly advised to do so   - cont ACEI 20mg QD and CCB 10mg QD - RTO in 1month for BP check   - advised to STOP drinking EtOH and STOP smoking   - referred to [facility]- pt has an appt with Dr Aaliyah Guzman scheduled for 6/4/2021   Feeling of chest tightness  Family history of heart attack  Elevated LDL cholesterol level  -     rosuvastatin (CRESTOR) 5 mg tablet; Take 1 tablet (5 mg total) by mouth daily  - The ASCVD Risk score (Chele Craig et al , 2013) failed to calculate for the following reasons: The valid HDL cholesterol range is 20 to 100 mg/dL    Current every day smoker  -     CT lung screening program; Future  - (+) current smoker - 2cigs/day - advised cessation    - has been smoking 1/4 pack for the past 40yrs  - (+) dry cough in the AM = multifactorial etiology - smoking vs ACEI vs allergies   - advised OTC allergy medication for now  - CT lung ordered for lung ca screening   - if not resolving t/c changing ACEI to ARB - pt aware and agreeable   Cough    Heavy drinker of alcohol  - advised cessation   Excessive caffeine intake  - now drinking 10oz vs 24oz     Anxiety and depression  -     escitalopram (LEXAPRO) 10 mg tablet;  Take 1 tablet (10 mg total) by mouth daily  - CHRSI-7 score of 4 in the office today - nml TSH   - (+) Vit D deficiency - has been taking supplements   - did start Lexapro 10mg QD about a month ago but ran out last week - has noted that is more irritable w/o it -- will restart SSRI   - has lost another pet  - a horse (of 21yrs), 2 dogs and kitten passed away recently   - has had thoughts of death/dying but no plan  - RTO in 6wks for f/u - pt aware and agreeable     Vitamin D deficiency  - cont supplements   Avulsion fracture of distal fibula  - strongly encouraged to get DEXA scan done         Subjective:    Patient ID: Donnell Jacobs is a 56 y o  female    63yo F presents to the office for f/u   1) HTN/HL  - established care on 2/9/2021 and was started on ACEI 10mg QD for elevated pressures   - was seen in the office on 2/24/2021 and ACEI was increased to 20mg QD   - CCB 10mg QD was added on 3/26/2021  - BP in the office 120/78 and 118/80 on repeat   - (+) intermittent chest tightness   - (+) dry cough - in the AM   - denies F/C/N/V/change in vision/HA/CP/SOB/wheezing/abd pain/D/C/LE edema   - (+) used to drink 24oz of coffee/day - has cut down to 10oz/day and sometimes drinks green-tea   - (+) used to drink 4glasses of wine or 2 [place]'s QD -- now drinks 3-4glasses of [place] QD or 3 light beer QD - does not get hangovers   - (+) current smoker - down to 2cigs/day - trying to stop - has been taking puffs of her 's cigs   - (+) FHx of MI in Father and PGF btw 60-62yo   - nml renal function and urine microalbumin   - Lipids (4/5/2021): , TG 90, ,  - has not been taking statin   - UTD with annual GYN exam and PAP   - has Cscope scheduled for 6/2021   2) Depression/Vit D deficiency   - CHRIS-7 score of 4 in the office today   - nml TSH   - (+) Vit D deficiency - has been taking supplements   - did start Lexapro 10mg QD about a month ago but ran out last week - has noted that is more irritable w/o it   - has lost another pet  - a horse (of 21yrs), 2 dogs and kitten passed away recently   - has had thoughts of death/dying but no plan  - has begun to affect her marriage       The following portions of the patient's history were reviewed and updated as appropriate: allergies, current medications, past family history, past medical history, past social history, past surgical history and problem list     Review of Systems  as per HPI    Objective:  /80 (BP Location: Left arm, Patient Position: Sitting, Cuff Size: Standard)   Pulse 96   Resp 16   Ht 5' 4 25" (1 632 m)   Wt 54 4 kg (120 lb)   SpO2 98%   BMI 20 44 kg/m²    Physical Exam  Vitals signs reviewed  Constitutional:       General: She is not in acute distress  Appearance: Normal appearance  She is normal weight  She is not ill-appearing, toxic-appearing or diaphoretic  HENT:      Head: Normocephalic and atraumatic  Right Ear: External ear normal       Left Ear: External ear normal    Eyes:      General: No scleral icterus  Right eye: No discharge  Left eye: No discharge  Extraocular Movements: Extraocular movements intact  Conjunctiva/sclera: Conjunctivae normal    Neck:      Musculoskeletal: Normal range of motion and neck supple  Cardiovascular:      Rate and Rhythm: Normal rate  Heart sounds: Normal heart sounds  No murmur  No friction rub  Pulmonary:      Effort: Pulmonary effort is normal  No respiratory distress  Breath sounds: Normal breath sounds  No stridor  No wheezing, rhonchi or rales  Abdominal:      General: Bowel sounds are normal  There is no distension  Palpations: Abdomen is soft  There is no mass  Tenderness: There is no abdominal tenderness  There is no guarding or rebound  Musculoskeletal: Normal range of motion  General: No swelling, tenderness, deformity or signs of injury  Right lower leg: No edema  Left lower leg: No edema  Skin:     General: Skin is warm  Neurological:      General: No focal deficit present  Mental Status: She is alert  Psychiatric:         Attention and Perception: Attention normal          Mood and Affect: Mood and affect normal          Speech: Speech normal          Behavior: Behavior normal  Behavior is cooperative  Thought Content: Thought content normal  Thought content does not include homicidal or suicidal ideation  Thought content does not include homicidal or suicidal plan  Cognition and Memory: Cognition and memory normal          Judgment: Judgment normal       Comments: CHRIS-7 score of 4          Depression Screening Follow-up Plan: Patient's depression screening was positive with a PHQ-2 score of   Their PHQ-9 score was   Patient assessed for underlying major depression  They have no active suicidal ideations  Brief counseling provided and recommend additional follow-up/re-evaluation next office visit  Continue regular follow-up with their psychologist/therapist/psychiatrist who is managing their mental health condition(s)

## 2021-05-26 PROBLEM — R07.89 CHEST TIGHTNESS: Status: ACTIVE | Noted: 2021-05-26

## 2021-05-26 PROBLEM — I10 ESSENTIAL HYPERTENSION: Status: ACTIVE | Noted: 2021-05-26

## 2021-06-04 ENCOUNTER — CONSULT (OUTPATIENT)
Dept: CARDIOLOGY CLINIC | Facility: CLINIC | Age: 61
End: 2021-06-04
Payer: COMMERCIAL

## 2021-06-04 VITALS
WEIGHT: 121.2 LBS | HEART RATE: 90 BPM | BODY MASS INDEX: 19.48 KG/M2 | DIASTOLIC BLOOD PRESSURE: 78 MMHG | HEIGHT: 66 IN | SYSTOLIC BLOOD PRESSURE: 120 MMHG | OXYGEN SATURATION: 99 %

## 2021-06-04 DIAGNOSIS — R07.89 CHEST TIGHTNESS: ICD-10-CM

## 2021-06-04 DIAGNOSIS — R60.0 LOWER EXTREMITY EDEMA: ICD-10-CM

## 2021-06-04 DIAGNOSIS — Z82.49 FAMILY HISTORY OF HEART DISEASE: ICD-10-CM

## 2021-06-04 DIAGNOSIS — E78.5 DYSLIPIDEMIA: ICD-10-CM

## 2021-06-04 DIAGNOSIS — I10 ESSENTIAL HYPERTENSION: ICD-10-CM

## 2021-06-04 DIAGNOSIS — Z72.0 TOBACCO ABUSE: ICD-10-CM

## 2021-06-04 PROCEDURE — 3008F BODY MASS INDEX DOCD: CPT | Performed by: INTERNAL MEDICINE

## 2021-06-04 PROCEDURE — 99205 OFFICE O/P NEW HI 60 MIN: CPT | Performed by: INTERNAL MEDICINE

## 2021-06-04 PROCEDURE — 93000 ELECTROCARDIOGRAM COMPLETE: CPT | Performed by: INTERNAL MEDICINE

## 2021-06-04 PROCEDURE — 4004F PT TOBACCO SCREEN RCVD TLK: CPT | Performed by: INTERNAL MEDICINE

## 2021-06-04 RX ORDER — HYDROCHLOROTHIAZIDE 12.5 MG/1
12.5 TABLET ORAL DAILY
Qty: 30 TABLET | Refills: 3 | Status: SHIPPED | OUTPATIENT
Start: 2021-06-04 | End: 2021-07-29

## 2021-06-04 RX ORDER — AMLODIPINE BESYLATE 5 MG/1
5 TABLET ORAL DAILY
Qty: 90 TABLET | Refills: 1 | Status: SHIPPED | OUTPATIENT
Start: 2021-06-04 | End: 2021-11-24

## 2021-06-04 NOTE — PROGRESS NOTES
Consultation - Cardiology Office  [facility]  John Wilburn 56 y o  female MRN: [id number]  : 1960  Unit/Bed#:  Encounter: [id number]      Assessment:     1  Chest tightness    2  Essential hypertension    3  Family history of heart disease    4  Tobacco abuse    5  Lower extremity edema    6  Dyslipidemia        Discussion summary and Plan:    1  Chest tightness  Patient has episodes of atypical chest tightness  She had a family history of heart disease she will be scheduled for Lexiscan stress test   Will also check echo Doppler for LV function  2  Essential hypertension  Patient was recently found to have high blood pressure  Partially could be related to anxiety and drinking Lifestyle modification strongly recommended  She has developed lower extremity edema after amlodipine  I took the liberty of decreasing amlodipine to 5 mg daily and adding hydrochlorothiazide 12 5 mg daily  Will continue lisinopril as before  I am concerned about causing hypokalemia due to her drinking  She may need to be at potassium pill will check electrolytes  3  Family history of heart disease  She had a strong family heart disease along with dyslipidemia  Will check coronary calcium score  She already has been tried on statin therapy she developed diarrhea with it encouraged her to restart it taking every other day  4  Dyslipidemia  She had dyslipidemia with good HDL  Due to family history may need to be on statin therapy  Will check calcium score  5  Tobacco abuse and excessive drinking  Discussed with patient he had open discussion she is willing to consider to cut down  She is already trying to decrease her smoking she smokes not 2 cigarettes a day encouraged her to cut back on alcohol  6  Lower extremity edema most likely secondary to amlodipine  Decrease the dose to half 5 mg and add hydrochlorothiazide will follow on it      All those issues discussed with patient  She agree with the plan  Your amlodipine dose has been decreased to 5 mg daily to prevent lower extremity edema    Continue lisinopril as before    Added hydrochlorothiazide 12 5 mg daily  This will help with swelling but we are concerned about electrolytes problem hence will check blood test    Encourage you to quit smoking and decrease alcohol intake    You can restart taking Crestor 5 mg every other day if you develop diarrhea again we may consider different medication    Scheduled for exercise stress test, echo Doppler and coronary calcium score    Thank you for your consultation  If you have any question please call me at [phone]    Counseling :  A description of the counseling  Goals and Barriers  Patient's ability to self care: Yes  Medication side effect reviewed with patient in detail and all their questions answered to their satisfaction  Primary Care Physician Requesting Consult: Ladonna Hernandez,     Reason for Consult / Principal Problem:  History of hypertension, family history of coronary artery disease and now episodes of chest tightness  HPI :     Marj Cosby is a 56y o  year old female who was referred by primary care doctor for episodes of chest tightness and strong family history of heart disease  Patient who has medical history significant for hypertension dyslipidemia with with good HDL, strong family history of heart problem with his grandfather and father having MI in 46s was sent to us for episodes of chest tightness  The test as tennis has no relationship to activities it comes in the middle of chest and gets better off its own  She used to smoke 4 cigarettes a day now she is trying to cut back  She has been smoking for long period of time  She also noted that since she is on amlodipine her lower extremities are swollen more in the evening  And she is concerned about it and would like some remedial measures about it      She is  she has 2 kids she lives with her   She does physically hard work but she does not do any structured exercise program    Recently she was tried on Crestor 5 mg however 4 days later she developed diarrhea and she has stopped taking it  She is willing to try different medications  She denies any nausea and vomiting  Since she was started on blood pressure medication it is pretty well controlled at this time  No other cardiovascular issues at this time  She had a blood test on February 2021 which were acceptable and reviewed other than high cholesterol but she has good HDL  But her risk for cardiovascular event is still high and had a strong family history of heart disease  Patient admits she drinks about 4 glasses of wine every day  Review of Systems   Constitutional: Negative for activity change, chills, diaphoresis, fever and unexpected weight change  HENT: Negative for congestion  Eyes: Negative for discharge and redness  Respiratory: Negative for cough, chest tightness, shortness of breath and wheezing  Cardiovascular: Positive for leg swelling  Negative for chest pain and palpitations  chest tightness   Gastrointestinal: Negative for abdominal pain, diarrhea and nausea  Endocrine: Negative  Genitourinary: Negative for decreased urine volume and urgency  Musculoskeletal: Negative  Negative for arthralgias, back pain and gait problem  Skin: Negative for rash and wound  Allergic/Immunologic: Negative  Neurological: Negative for dizziness, seizures, syncope, weakness, light-headedness and headaches  Hematological: Negative  Psychiatric/Behavioral: Negative for agitation and confusion  The patient is nervous/anxious          Historical Information   Past Medical History:   Diagnosis Date    Hypertension     Hypoglycemia     Vitamin D deficiency      Past Surgical History:   Procedure Laterality Date    WISDOM TOOTH EXTRACTION       Social History     Substance and Sexual Activity   Alcohol Use Yes    Frequency: 4 or more times a week    Drinks per session: 3 or 4    Binge frequency: Never    Comment: 4 glasses of wine or 2 Manhattans/daily     Social History     Substance and Sexual Activity   Drug Use Never     Social History     Tobacco Use   Smoking Status Current Every Day Smoker    Packs/day: 0 25    Years: 40 00    Pack years: 10 00    Types: Cigarettes   Smokeless Tobacco Never Used   Tobacco Comment    2-4cigs/day     Family History:   Family History   Problem Relation Age of Onset    Breast cancer Mother 67    Heart attack Father 61    Heart attack Paternal Grandfather 61    No Known Problems Sister     Pancreatic cancer Maternal Grandmother     Heart disease Maternal Grandfather 48    No Known Problems Paternal Grandmother     No Known Problems Sister     Diabetes Neg Hx     Hyperlipidemia Neg Hx     Hypertension Neg Hx        Meds/Allergies     No Known Allergies    Current Outpatient Medications:     amLODIPine (NORVASC) 5 mg tablet, Take 1 tablet (5 mg total) by mouth daily, Disp: 90 tablet, Rfl: 1    escitalopram (LEXAPRO) 10 mg tablet, Take 1 tablet (10 mg total) by mouth daily, Disp: 90 tablet, Rfl: 0    lisinopril (ZESTRIL) 20 mg tablet, Take 1 tablet (20 mg total) by mouth daily, Disp: 90 tablet, Rfl: 0    estradiol (ESTRACE) 0 1 mg/g vaginal cream, Insert 0 5 gm intravaginally HS x 14 night then 0 5 gm twice weekly (Patient not taking: Reported on 6/4/2021), Disp: 42 5 g, Rfl: 1    hydrochlorothiazide (HYDRODIURIL) 12 5 mg tablet, Take 1 tablet (12 5 mg total) by mouth daily, Disp: 30 tablet, Rfl: 3    Na Sulfate-K Sulfate-Mg Sulf (Suprep Bowel Prep Kit) 17 5-3 13-1 6 GM/177ML SOLN, Take as directed by the office   (Patient not taking: Reported on 6/4/2021), Disp: 354 mL, Rfl: 0    rosuvastatin (CRESTOR) 5 mg tablet, Take 1 tablet (5 mg total) by mouth daily (Patient not taking: Reported on 6/4/2021), Disp: 90 tablet, Rfl: 0    Vitals: Blood pressure 120/78, pulse 90, height 5' 5 75" (1 67 m), weight 55 kg (121 lb 3 2 oz), SpO2 99 %, not currently breastfeeding  Body mass index is 19 71 kg/m²  Wt Readings from Last 3 Encounters:   21 55 kg (121 lb 3 2 oz)   21 54 4 kg (120 lb)   21 53 5 kg (118 lb)     Vitals:    21 1048   Weight: 55 kg (121 lb 3 2 oz)     BP Readings from Last 3 Encounters:   21 120/78   21 118/80   21 126/80         Physical Exam    Neurologic:  Alert & oriented x 3, no new focal deficits, Not in any acute distress,  Constitutional:  Well developed, well nourished, non-toxic appearance   Eyes:  Pupil equal and reacting to light, conjunctiva normal, No JVP, No LNP   HENT:  Atraumatic, oropharynx moist, Neck- normal range of motion, no tenderness,  Neck supple   Respiratory:  Bilateral air entry, mostly clear to auscultation  Cardiovascular: S1-S2 regular with a 2/6 ejection systolic murmur and S4 is present  GI:  Soft, nondistended, normal bowel sounds, nontender, no hepatosplenomegaly appreciated  Musculoskeletal:  No edema, no tenderness, no deformities  Skin:  Well hydrated, no rash   Lymphatic:  No lymphadenopathy noted   Extremities:  Mild edema    Diagnostic Studies Review Cardio:    None recent    EK lead EKG 2021 shows normal sinus rhythm heart rate 87 beats per minute rare PVCs  No other significant changes        Lab Review   Lab Results   Component Value Date    WBC 5 34 2021    HGB 14 7 2021    HCT 43 8 2021     (H) 2021    RDW 12 5 2021     2021     BMP:  Lab Results   Component Value Date    SODIUM 143 2021    K 3 9 2021     2021    CO2 29 2021    BUN 10 2021    CREATININE 0 88 2021    GLUC 100 2021    CALCIUM 9 7 2021    EGFR 71 2021     LFT:  Lab Results   Component Value Date    AST 22 2021    ALT 2021    ALKPHOS 67 02/17/2021    TP 7 2 02/17/2021    ALB 4 0 02/17/2021      Lab Results   Component Value Date    FWA2NQWXIWFG 1 720 02/17/2021     Lipid Profile:   Lab Results   Component Value Date    CHOLESTEROL 280 (H) 04/05/2021     04/05/2021    LDLCALC 148 (H) 04/05/2021    TRIG 90 04/05/2021     Lab Results   Component Value Date    CHOLESTEROL 280 (H) 04/05/2021       Dr Sd Mendez MD [facility]      "This note has been constructed using a voice recognition system  Therefore there may be syntax, spelling, and/or grammatical errors   Please call if you have any questions  "

## 2021-06-04 NOTE — PATIENT INSTRUCTIONS
Your amlodipine dose has been decreased to 5 mg daily to prevent lower extremity edema    Continue lisinopril as before    Added hydrochlorothiazide 12 5 mg daily    This will help with swelling but we are concerned about electrolytes problem hence will check blood test    Encourage you to quit smoking and decrease alcohol intake    You can restart taking Crestor 5 mg every other day if you develop diarrhea again we may consider different medication    Scheduled for exercise stress test, echo Doppler and coronary calcium score

## 2021-06-08 ENCOUNTER — VBI (OUTPATIENT)
Dept: ADMINISTRATIVE | Facility: OTHER | Age: 61
End: 2021-06-08

## 2021-06-21 ENCOUNTER — TELEPHONE (OUTPATIENT)
Dept: GASTROENTEROLOGY | Facility: CLINIC | Age: 61
End: 2021-06-21

## 2021-06-24 ENCOUNTER — RA CDI HCC (OUTPATIENT)
Dept: OTHER | Facility: HOSPITAL | Age: 61
End: 2021-06-24

## 2021-06-24 NOTE — PROGRESS NOTES
[facility]          Chart reviewed, no opportunity found: CHART REVIEWED, NO OPPORTUNITY FOUND                     Patients insurance company:  [employer] [facility] (Medicare Advantage and Commercial)

## 2021-06-30 ENCOUNTER — OFFICE VISIT (OUTPATIENT)
Dept: FAMILY MEDICINE CLINIC | Facility: CLINIC | Age: 61
End: 2021-06-30
Payer: COMMERCIAL

## 2021-06-30 VITALS
HEART RATE: 69 BPM | SYSTOLIC BLOOD PRESSURE: 126 MMHG | WEIGHT: 122 LBS | HEIGHT: 66 IN | OXYGEN SATURATION: 98 % | DIASTOLIC BLOOD PRESSURE: 82 MMHG | RESPIRATION RATE: 16 BRPM | BODY MASS INDEX: 19.61 KG/M2

## 2021-06-30 DIAGNOSIS — I10 ESSENTIAL HYPERTENSION: Primary | ICD-10-CM

## 2021-06-30 DIAGNOSIS — E55.9 VITAMIN D DEFICIENCY: ICD-10-CM

## 2021-06-30 DIAGNOSIS — F32.A ANXIETY AND DEPRESSION: ICD-10-CM

## 2021-06-30 DIAGNOSIS — Z78.9 HEAVY DRINKER OF ALCOHOL: ICD-10-CM

## 2021-06-30 DIAGNOSIS — Z82.49 FAMILY HISTORY OF HEART ATTACK: ICD-10-CM

## 2021-06-30 DIAGNOSIS — Z78.9 EXCESSIVE CAFFEINE INTAKE: ICD-10-CM

## 2021-06-30 DIAGNOSIS — S82.839A AVULSION FRACTURE OF DISTAL FIBULA: ICD-10-CM

## 2021-06-30 DIAGNOSIS — E78.00 ELEVATED LDL CHOLESTEROL LEVEL: ICD-10-CM

## 2021-06-30 DIAGNOSIS — H93.8X1 CONGESTION OF RIGHT EAR: ICD-10-CM

## 2021-06-30 DIAGNOSIS — F17.200 CURRENT EVERY DAY SMOKER: ICD-10-CM

## 2021-06-30 DIAGNOSIS — F41.9 ANXIETY AND DEPRESSION: ICD-10-CM

## 2021-06-30 DIAGNOSIS — R05.9 COUGH: ICD-10-CM

## 2021-06-30 PROCEDURE — 3074F SYST BP LT 130 MM HG: CPT | Performed by: FAMILY MEDICINE

## 2021-06-30 PROCEDURE — 3008F BODY MASS INDEX DOCD: CPT | Performed by: FAMILY MEDICINE

## 2021-06-30 PROCEDURE — 4004F PT TOBACCO SCREEN RCVD TLK: CPT | Performed by: FAMILY MEDICINE

## 2021-06-30 PROCEDURE — 3079F DIAST BP 80-89 MM HG: CPT | Performed by: FAMILY MEDICINE

## 2021-06-30 PROCEDURE — 99214 OFFICE O/P EST MOD 30 MIN: CPT | Performed by: FAMILY MEDICINE

## 2021-06-30 NOTE — PROGRESS NOTES
Assessment/Plan:   Diagnoses and all orders for this visit:    Essential hypertension  - BP in the office 118/84 and 126/82 on my repeat   - did see Cardio 6/4/2021 - noted to have swelling with CCB, dose was decreased to 5mg QD and HCTZ 12 5mg QD was added  - also on ACEI 20mg QD   - intermittent chest tightness - resolved   - EKG from 4/9/2021- NSR at 84bpm, no ST-T wave changes   - has cut down to 10oz caffeine/day and sometimes drinks green-tea   - drinks 3-4glasses of [place]osc[place] QD or 3 light beer QD and 2 Manhattan's 2-3x/week  - advised cessation   - (+) current smoker - has been taking puffs of her 's cigs - advised cessation    - (+) FHx of MI in Father and PGF btw 60-64yo   - nml renal function and urine microalbumin  - Lipids (4/5/2021): , TG 90, ,    - was started on Crestor 5mg QHS but with diarrhea - was advised to take Crestor 5mg Q48  - cont ACEI 20mg QD, CCB 5mg QD and HCTZ 12 5mg QD   - (+) cough - could be 2/2 allergies as also with ear congestion - advised OTC Mucinex for now, if 2/2 ACEI - will switch to ARB   - advised to STOP drinking EtOH and STOP smoking  - RTO in 3months for f/u = pt aware and agreeable   Family history of heart attack  Elevated LDL cholesterol level    Cough  Congestion of right ear  - (+) cough - could be 2/2 allergies as also with ear congestion - advised OTC Mucinex for now, if 2/2 ACEI - will switch to ARB     Current every day smoker  - (+) current smoker - has been taking puffs of her 's cigs - advised cessation    - has been smoking 1/4 pack for the past 40yrs  - (+) dry cough in the AM = multifactorial etiology - smoking vs ACEI vs allergies   - advised OTC allergy medication for now  - CT lung ordered for lung ca screening   - if not resolving t/c changing ACEI to ARB - pt aware and agreeable   Heavy drinker of alcohol  -     Ambulatory referral to Chemical Dependency; Future  -     Ambulatory referral to Psychiatry;  Future  - Ambulatory referral to behavioral health therapists; Future  Excessive caffeine intake    Anxiety and depression  -     Ambulatory referral to Psychiatry; Future  -     Ambulatory referral to behavioral health therapists; Future  - cont Lexapro 10mg QD for now   - referral given to Addiction Med, Psych and Therapist   - pt states she drinks to be happy     Vitamin D deficiency  -     DXA bone density spine hip and pelvis; Future  Avulsion fracture of distal fibula        Subjective:    Patient ID: Damari Pike is a 56 y o  female    HPI   63yo F presents to the office for f/u   1) HTN/HL  - established care on 2/9/2021 and was started on ACEI 10mg QD for elevated pressures   - was seen in the office on 2/24/2021 and ACEI was increased to 20mg QD   - CCB 10mg QD was added on 3/26/2021  - did see Cardio 6/4/2021 - noted to have swelling with CCB, dose was decreased to 5mg QD and HCTZ 12 5mg QD was added  - noted to have diarrhea with Crestor - doing 5mg Q48hrs for now   - BP in the office 118/84 and 126/82 on my repeat   - chest tightness has resolved   - (+) dry cough - in the AM   - denies F/C/N/V/change in vision/HA/CP/SOB/wheezing/abd pain/C/LE edema   - (+) used to drink 24oz of coffee/day - has cut down to 10oz/day and sometimes drinks green-tea   - now drinks 3-4glasses of Moscato QD or 3 light beer QD and 2 Manhattan's 2-3x/week   - (+) current smoker - has been taking puffs of her 's cigs   - (+) FHx of MI in Father and PGF btw 60-64yo   - nml renal function and urine microalbumin   - Lipids (4/5/2021): , TG 90, ,    - UTD with annual GYN exam and PAP   - canceled Cscope scheduled with GI   2) Depression/Vit D deficiency   - nml TSH   - (+) Vit D deficiency - has been taking supplements   - currently on Lexapro 10mg QD   - has lost another pet  - a horse (of 21yrs), 2 dogs and kitten passed away recently   - has had thoughts of death/dying but no plan  - has begun to affect her marriage   - drinks to feel happy       The following portions of the patient's history were reviewed and updated as appropriate: allergies, current medications, past family history, past medical history, past social history, past surgical history and problem list     Review of Systems  as per HPI    Objective:  /82 (BP Location: Left arm)   Pulse 69   Resp 16   Ht 5' 5 75" (1 67 m)   Wt 55 3 kg (122 lb)   SpO2 98%   BMI 19 84 kg/m²    Physical Exam  Vitals reviewed  Constitutional:       General: She is not in acute distress  Appearance: Normal appearance  She is normal weight  She is not ill-appearing, toxic-appearing or diaphoretic  HENT:      Head: Normocephalic and atraumatic  Right Ear: External ear normal  A middle ear effusion is present  Left Ear: External ear normal  No swelling or tenderness  No middle ear effusion  There is no impacted cerumen  Nose: Nose normal       Right Sinus: No maxillary sinus tenderness or frontal sinus tenderness  Left Sinus: No maxillary sinus tenderness or frontal sinus tenderness  Mouth/Throat:      Lips: Pink  No lesions  Mouth: Mucous membranes are moist       Dentition: Normal dentition  Tongue: No lesions  Tongue does not deviate from midline  Palate: No lesions  Pharynx: Oropharynx is clear  Uvula midline  No pharyngeal swelling, oropharyngeal exudate, posterior oropharyngeal erythema or uvula swelling  Tonsils: No tonsillar exudate or tonsillar abscesses  Eyes:      General: No scleral icterus  Right eye: No discharge  Left eye: No discharge  Extraocular Movements: Extraocular movements intact  Conjunctiva/sclera: Conjunctivae normal    Cardiovascular:      Rate and Rhythm: Normal rate and regular rhythm  Heart sounds: Normal heart sounds  No murmur heard  No friction rub  No gallop  Pulmonary:      Effort: Pulmonary effort is normal  No respiratory distress  Breath sounds: Normal breath sounds  No stridor  No wheezing, rhonchi or rales  Abdominal:      General: Bowel sounds are normal       Palpations: Abdomen is soft  Musculoskeletal:         General: Normal range of motion  Cervical back: Normal range of motion and neck supple  Right lower leg: No edema  Left lower leg: No edema  Skin:     General: Skin is warm  Neurological:      General: No focal deficit present  Mental Status: She is alert and oriented to person, place, and time     Psychiatric:         Mood and Affect: Mood normal          Behavior: Behavior normal

## 2021-07-12 DIAGNOSIS — I10 ESSENTIAL HYPERTENSION: ICD-10-CM

## 2021-07-14 RX ORDER — LISINOPRIL 20 MG/1
TABLET ORAL
Qty: 90 TABLET | Refills: 0 | Status: SHIPPED | OUTPATIENT
Start: 2021-07-14 | End: 2021-12-08 | Stop reason: SDUPTHER

## 2021-07-29 DIAGNOSIS — R07.89 CHEST TIGHTNESS: ICD-10-CM

## 2021-07-29 DIAGNOSIS — Z82.49 FAMILY HISTORY OF HEART DISEASE: ICD-10-CM

## 2021-07-29 DIAGNOSIS — Z72.0 TOBACCO ABUSE: ICD-10-CM

## 2021-07-29 DIAGNOSIS — R60.0 LOWER EXTREMITY EDEMA: ICD-10-CM

## 2021-07-29 DIAGNOSIS — I10 ESSENTIAL HYPERTENSION: ICD-10-CM

## 2021-07-29 RX ORDER — HYDROCHLOROTHIAZIDE 12.5 MG/1
TABLET ORAL
Qty: 30 TABLET | Refills: 3 | Status: SHIPPED | OUTPATIENT
Start: 2021-07-29 | End: 2021-11-21

## 2021-08-11 DIAGNOSIS — F32.A ANXIETY AND DEPRESSION: ICD-10-CM

## 2021-08-11 DIAGNOSIS — F41.9 ANXIETY AND DEPRESSION: ICD-10-CM

## 2021-08-11 RX ORDER — ESCITALOPRAM OXALATE 10 MG/1
TABLET ORAL
Qty: 90 TABLET | Refills: 0 | Status: SHIPPED | OUTPATIENT
Start: 2021-08-11 | End: 2021-11-10

## 2021-09-13 ENCOUNTER — TELEPHONE (OUTPATIENT)
Dept: PSYCHIATRY | Facility: CLINIC | Age: 61
End: 2021-09-13

## 2021-09-22 ENCOUNTER — RA CDI HCC (OUTPATIENT)
Dept: OTHER | Facility: HOSPITAL | Age: 61
End: 2021-09-22

## 2021-09-22 NOTE — PROGRESS NOTES
[facility]       Chart reviewed, no opportunity found: CHART REVIEWED, NO OPPORTUNITY FOUND                        Patients insurance company:  [employer] [facility] (Medicare Advantage and Commercial)

## 2021-11-10 ENCOUNTER — OFFICE VISIT (OUTPATIENT)
Dept: FAMILY MEDICINE CLINIC | Facility: CLINIC | Age: 61
End: 2021-11-10
Payer: COMMERCIAL

## 2021-11-10 VITALS
HEIGHT: 67 IN | SYSTOLIC BLOOD PRESSURE: 124 MMHG | WEIGHT: 122 LBS | BODY MASS INDEX: 19.15 KG/M2 | OXYGEN SATURATION: 94 % | HEART RATE: 76 BPM | DIASTOLIC BLOOD PRESSURE: 80 MMHG | RESPIRATION RATE: 16 BRPM

## 2021-11-10 DIAGNOSIS — E78.00 ELEVATED LDL CHOLESTEROL LEVEL: ICD-10-CM

## 2021-11-10 DIAGNOSIS — I10 ESSENTIAL HYPERTENSION: Primary | ICD-10-CM

## 2021-11-10 DIAGNOSIS — R05.8 COUGH DUE TO ACE INHIBITOR: ICD-10-CM

## 2021-11-10 DIAGNOSIS — F41.9 ANXIETY AND DEPRESSION: ICD-10-CM

## 2021-11-10 DIAGNOSIS — T46.4X5A COUGH DUE TO ACE INHIBITOR: ICD-10-CM

## 2021-11-10 DIAGNOSIS — Z78.9 EXCESSIVE CAFFEINE INTAKE: ICD-10-CM

## 2021-11-10 DIAGNOSIS — Z78.9 HEAVY DRINKER OF ALCOHOL: ICD-10-CM

## 2021-11-10 DIAGNOSIS — F32.A ANXIETY AND DEPRESSION: ICD-10-CM

## 2021-11-10 DIAGNOSIS — Z23 NEED FOR INFLUENZA VACCINATION: ICD-10-CM

## 2021-11-10 DIAGNOSIS — E55.9 VITAMIN D DEFICIENCY: ICD-10-CM

## 2021-11-10 DIAGNOSIS — Z12.11 COLON CANCER SCREENING: ICD-10-CM

## 2021-11-10 DIAGNOSIS — F17.200 CURRENT EVERY DAY SMOKER: ICD-10-CM

## 2021-11-10 DIAGNOSIS — S82.839A AVULSION FRACTURE OF DISTAL FIBULA: ICD-10-CM

## 2021-11-10 DIAGNOSIS — Z82.49 FAMILY HISTORY OF HEART ATTACK: ICD-10-CM

## 2021-11-10 PROBLEM — F33.9 DEPRESSION, RECURRENT (HCC): Status: ACTIVE | Noted: 2021-11-10

## 2021-11-10 PROCEDURE — 3079F DIAST BP 80-89 MM HG: CPT | Performed by: FAMILY MEDICINE

## 2021-11-10 PROCEDURE — 4004F PT TOBACCO SCREEN RCVD TLK: CPT | Performed by: FAMILY MEDICINE

## 2021-11-10 PROCEDURE — 3008F BODY MASS INDEX DOCD: CPT | Performed by: FAMILY MEDICINE

## 2021-11-10 PROCEDURE — 90471 IMMUNIZATION ADMIN: CPT | Performed by: FAMILY MEDICINE

## 2021-11-10 PROCEDURE — 90682 RIV4 VACC RECOMBINANT DNA IM: CPT | Performed by: FAMILY MEDICINE

## 2021-11-10 PROCEDURE — 3725F SCREEN DEPRESSION PERFORMED: CPT | Performed by: FAMILY MEDICINE

## 2021-11-10 PROCEDURE — 3074F SYST BP LT 130 MM HG: CPT | Performed by: FAMILY MEDICINE

## 2021-11-10 PROCEDURE — 99214 OFFICE O/P EST MOD 30 MIN: CPT | Performed by: FAMILY MEDICINE

## 2021-11-10 RX ORDER — SERTRALINE HYDROCHLORIDE 25 MG/1
25 TABLET, FILM COATED ORAL DAILY
Qty: 30 TABLET | Refills: 0 | Status: SHIPPED | OUTPATIENT
Start: 2021-11-10 | End: 2021-12-08 | Stop reason: SDUPTHER

## 2021-11-12 ENCOUNTER — VBI (OUTPATIENT)
Dept: ADMINISTRATIVE | Facility: OTHER | Age: 61
End: 2021-11-12

## 2021-11-21 DIAGNOSIS — Z72.0 TOBACCO ABUSE: ICD-10-CM

## 2021-11-21 DIAGNOSIS — I10 ESSENTIAL HYPERTENSION: ICD-10-CM

## 2021-11-21 DIAGNOSIS — R07.89 CHEST TIGHTNESS: ICD-10-CM

## 2021-11-21 DIAGNOSIS — R60.0 LOWER EXTREMITY EDEMA: ICD-10-CM

## 2021-11-21 DIAGNOSIS — Z82.49 FAMILY HISTORY OF HEART DISEASE: ICD-10-CM

## 2021-11-21 RX ORDER — HYDROCHLOROTHIAZIDE 12.5 MG/1
TABLET ORAL
Qty: 90 TABLET | Refills: 1 | Status: SHIPPED | OUTPATIENT
Start: 2021-11-21 | End: 2022-05-31

## 2021-11-24 DIAGNOSIS — I10 ESSENTIAL HYPERTENSION: ICD-10-CM

## 2021-11-24 RX ORDER — AMLODIPINE BESYLATE 5 MG/1
TABLET ORAL
Qty: 90 TABLET | Refills: 1 | Status: SHIPPED | OUTPATIENT
Start: 2021-11-24 | End: 2022-03-16

## 2021-11-29 ENCOUNTER — APPOINTMENT (OUTPATIENT)
Dept: LAB | Facility: CLINIC | Age: 61
End: 2021-11-29
Payer: COMMERCIAL

## 2021-11-29 DIAGNOSIS — E78.5 DYSLIPIDEMIA: ICD-10-CM

## 2021-11-29 DIAGNOSIS — I10 ESSENTIAL HYPERTENSION: ICD-10-CM

## 2021-11-29 DIAGNOSIS — E78.00 ELEVATED LDL CHOLESTEROL LEVEL: ICD-10-CM

## 2021-11-29 DIAGNOSIS — R07.89 CHEST TIGHTNESS: ICD-10-CM

## 2021-11-29 DIAGNOSIS — E55.9 VITAMIN D DEFICIENCY: ICD-10-CM

## 2021-11-29 DIAGNOSIS — Z82.49 FAMILY HISTORY OF HEART DISEASE: ICD-10-CM

## 2021-11-29 DIAGNOSIS — R60.0 LOWER EXTREMITY EDEMA: ICD-10-CM

## 2021-11-29 DIAGNOSIS — Z72.0 TOBACCO ABUSE: ICD-10-CM

## 2021-11-29 LAB
25(OH)D3 SERPL-MCNC: 40.5 NG/ML (ref 30–100)
ANION GAP SERPL CALCULATED.3IONS-SCNC: 9 MMOL/L (ref 4–13)
BUN SERPL-MCNC: 10 MG/DL (ref 5–25)
CALCIUM SERPL-MCNC: 9.8 MG/DL (ref 8.3–10.1)
CHLORIDE SERPL-SCNC: 101 MMOL/L (ref 100–108)
CHOLEST SERPL-MCNC: 327 MG/DL
CO2 SERPL-SCNC: 26 MMOL/L (ref 21–32)
CREAT SERPL-MCNC: 0.92 MG/DL (ref 0.6–1.3)
GFR SERPL CREATININE-BSD FRML MDRD: 66 ML/MIN/1.73SQ M
GLUCOSE P FAST SERPL-MCNC: 119 MG/DL (ref 65–99)
HDLC SERPL-MCNC: 122 MG/DL
LDLC SERPL CALC-MCNC: 178 MG/DL (ref 0–100)
NONHDLC SERPL-MCNC: 205 MG/DL
POTASSIUM SERPL-SCNC: 3.9 MMOL/L (ref 3.5–5.3)
SODIUM SERPL-SCNC: 136 MMOL/L (ref 136–145)
TRIGL SERPL-MCNC: 137 MG/DL
TSH SERPL DL<=0.05 MIU/L-ACNC: 1.38 UIU/ML (ref 0.36–3.74)

## 2021-11-29 PROCEDURE — 80048 BASIC METABOLIC PNL TOTAL CA: CPT

## 2021-11-29 PROCEDURE — 82306 VITAMIN D 25 HYDROXY: CPT

## 2021-11-29 PROCEDURE — 36415 COLL VENOUS BLD VENIPUNCTURE: CPT

## 2021-11-29 PROCEDURE — 80061 LIPID PANEL: CPT

## 2021-11-29 PROCEDURE — 84443 ASSAY THYROID STIM HORMONE: CPT | Performed by: FAMILY MEDICINE

## 2021-11-30 DIAGNOSIS — E78.5 DYSLIPIDEMIA: Primary | ICD-10-CM

## 2021-11-30 RX ORDER — ROSUVASTATIN CALCIUM 5 MG/1
5 TABLET, COATED ORAL DAILY
Qty: 90 TABLET | Refills: 0 | Status: SHIPPED | OUTPATIENT
Start: 2021-11-30 | End: 2022-01-12

## 2021-12-02 ENCOUNTER — TELEPHONE (OUTPATIENT)
Dept: FAMILY MEDICINE CLINIC | Facility: CLINIC | Age: 61
End: 2021-12-02

## 2021-12-08 ENCOUNTER — TELEPHONE (OUTPATIENT)
Dept: PSYCHIATRY | Facility: CLINIC | Age: 61
End: 2021-12-08

## 2021-12-08 ENCOUNTER — OFFICE VISIT (OUTPATIENT)
Dept: FAMILY MEDICINE CLINIC | Facility: CLINIC | Age: 61
End: 2021-12-08
Payer: COMMERCIAL

## 2021-12-08 VITALS
WEIGHT: 121 LBS | DIASTOLIC BLOOD PRESSURE: 80 MMHG | SYSTOLIC BLOOD PRESSURE: 124 MMHG | HEART RATE: 90 BPM | HEIGHT: 67 IN | BODY MASS INDEX: 18.99 KG/M2 | OXYGEN SATURATION: 98 % | RESPIRATION RATE: 16 BRPM

## 2021-12-08 DIAGNOSIS — Z78.9 EXCESSIVE CAFFEINE INTAKE: ICD-10-CM

## 2021-12-08 DIAGNOSIS — F41.9 ANXIETY AND DEPRESSION: ICD-10-CM

## 2021-12-08 DIAGNOSIS — T46.4X5A COUGH DUE TO ACE INHIBITOR: ICD-10-CM

## 2021-12-08 DIAGNOSIS — I10 ESSENTIAL HYPERTENSION: Primary | ICD-10-CM

## 2021-12-08 DIAGNOSIS — Z82.49 FAMILY HISTORY OF HEART ATTACK: ICD-10-CM

## 2021-12-08 DIAGNOSIS — Z12.11 COLON CANCER SCREENING: ICD-10-CM

## 2021-12-08 DIAGNOSIS — Z78.9 HEAVY DRINKER OF ALCOHOL: ICD-10-CM

## 2021-12-08 DIAGNOSIS — E78.00 ELEVATED LDL CHOLESTEROL LEVEL: ICD-10-CM

## 2021-12-08 DIAGNOSIS — E55.9 VITAMIN D DEFICIENCY: ICD-10-CM

## 2021-12-08 DIAGNOSIS — R73.01 ELEVATED FASTING BLOOD SUGAR: ICD-10-CM

## 2021-12-08 DIAGNOSIS — F17.200 CURRENT EVERY DAY SMOKER: ICD-10-CM

## 2021-12-08 DIAGNOSIS — F32.A ANXIETY AND DEPRESSION: ICD-10-CM

## 2021-12-08 DIAGNOSIS — R05.8 COUGH DUE TO ACE INHIBITOR: ICD-10-CM

## 2021-12-08 LAB — SL AMB POCT HEMOGLOBIN AIC: 4.8 (ref ?–6.5)

## 2021-12-08 PROCEDURE — 3008F BODY MASS INDEX DOCD: CPT | Performed by: FAMILY MEDICINE

## 2021-12-08 PROCEDURE — 3079F DIAST BP 80-89 MM HG: CPT | Performed by: FAMILY MEDICINE

## 2021-12-08 PROCEDURE — 99214 OFFICE O/P EST MOD 30 MIN: CPT | Performed by: FAMILY MEDICINE

## 2021-12-08 PROCEDURE — 4004F PT TOBACCO SCREEN RCVD TLK: CPT | Performed by: FAMILY MEDICINE

## 2021-12-08 PROCEDURE — 3725F SCREEN DEPRESSION PERFORMED: CPT | Performed by: FAMILY MEDICINE

## 2021-12-08 PROCEDURE — 3074F SYST BP LT 130 MM HG: CPT | Performed by: FAMILY MEDICINE

## 2021-12-08 PROCEDURE — 83036 HEMOGLOBIN GLYCOSYLATED A1C: CPT | Performed by: FAMILY MEDICINE

## 2021-12-08 RX ORDER — LISINOPRIL 20 MG/1
20 TABLET ORAL DAILY
Qty: 90 TABLET | Refills: 0 | Status: SHIPPED | OUTPATIENT
Start: 2021-12-08 | End: 2022-02-16

## 2021-12-09 ENCOUNTER — TELEPHONE (OUTPATIENT)
Dept: FAMILY MEDICINE CLINIC | Facility: CLINIC | Age: 61
End: 2021-12-09

## 2021-12-28 ENCOUNTER — TELEPHONE (OUTPATIENT)
Dept: CARDIOLOGY CLINIC | Facility: CLINIC | Age: 61
End: 2021-12-28

## 2021-12-28 NOTE — TELEPHONE ENCOUNTER
Spoke with pt in regards to prepping her chart for her upcoming appt 01/11/22  She did not get her testings done due to her insurance  She wants to know if she should still come to her appt or not  Please Advise

## 2022-01-02 NOTE — TELEPHONE ENCOUNTER
I think if she has not done any of the testing I do not know what else I can add  If she is asymptomatic she can reschedule if she has any questions then she can keep an appointment

## 2022-01-12 ENCOUNTER — OFFICE VISIT (OUTPATIENT)
Dept: FAMILY MEDICINE CLINIC | Facility: CLINIC | Age: 62
End: 2022-01-12
Payer: COMMERCIAL

## 2022-01-12 VITALS
HEIGHT: 67 IN | OXYGEN SATURATION: 98 % | SYSTOLIC BLOOD PRESSURE: 118 MMHG | DIASTOLIC BLOOD PRESSURE: 80 MMHG | RESPIRATION RATE: 16 BRPM | WEIGHT: 118 LBS | HEART RATE: 98 BPM | BODY MASS INDEX: 18.52 KG/M2

## 2022-01-12 DIAGNOSIS — Z82.49 FAMILY HISTORY OF HEART ATTACK: ICD-10-CM

## 2022-01-12 DIAGNOSIS — T46.4X5A COUGH DUE TO ACE INHIBITOR: ICD-10-CM

## 2022-01-12 DIAGNOSIS — Z78.9 HEAVY DRINKER OF ALCOHOL: ICD-10-CM

## 2022-01-12 DIAGNOSIS — F41.9 ANXIETY AND DEPRESSION: ICD-10-CM

## 2022-01-12 DIAGNOSIS — I10 ESSENTIAL HYPERTENSION: Primary | ICD-10-CM

## 2022-01-12 DIAGNOSIS — R05.8 COUGH DUE TO ACE INHIBITOR: ICD-10-CM

## 2022-01-12 DIAGNOSIS — Z78.9 EXCESSIVE CAFFEINE INTAKE: ICD-10-CM

## 2022-01-12 DIAGNOSIS — F33.9 DEPRESSION, RECURRENT (HCC): ICD-10-CM

## 2022-01-12 DIAGNOSIS — F32.A ANXIETY AND DEPRESSION: ICD-10-CM

## 2022-01-12 DIAGNOSIS — E78.00 ELEVATED LDL CHOLESTEROL LEVEL: ICD-10-CM

## 2022-01-12 DIAGNOSIS — F17.200 CURRENT EVERY DAY SMOKER: ICD-10-CM

## 2022-01-12 PROCEDURE — 4004F PT TOBACCO SCREEN RCVD TLK: CPT | Performed by: FAMILY MEDICINE

## 2022-01-12 PROCEDURE — 3008F BODY MASS INDEX DOCD: CPT | Performed by: FAMILY MEDICINE

## 2022-01-12 PROCEDURE — 99214 OFFICE O/P EST MOD 30 MIN: CPT | Performed by: FAMILY MEDICINE

## 2022-01-12 PROCEDURE — 3079F DIAST BP 80-89 MM HG: CPT | Performed by: FAMILY MEDICINE

## 2022-01-12 PROCEDURE — 3725F SCREEN DEPRESSION PERFORMED: CPT | Performed by: FAMILY MEDICINE

## 2022-01-12 PROCEDURE — 3074F SYST BP LT 130 MM HG: CPT | Performed by: FAMILY MEDICINE

## 2022-01-12 RX ORDER — ATORVASTATIN CALCIUM 10 MG/1
10 TABLET, FILM COATED ORAL DAILY
Qty: 30 TABLET | Refills: 0 | Status: SHIPPED | OUTPATIENT
Start: 2022-01-12 | End: 2022-02-16

## 2022-01-12 RX ORDER — SERTRALINE HYDROCHLORIDE 100 MG/1
100 TABLET, FILM COATED ORAL DAILY
Qty: 30 TABLET | Refills: 0
Start: 2022-01-12 | End: 2022-02-16 | Stop reason: SDUPTHER

## 2022-01-12 NOTE — PROGRESS NOTES
Assessment/Plan:   Diagnoses and all orders for this visit:    Essential hypertension  - BP stable in the office at 118/80  - did see Cardio 6/4/2021 - noted to have swelling with CCB, dose was decreased to 5mg QD and HCTZ 12 5mg QD was added - her insurance does not cover [facility] - referral given for Cardio in [facility] - advised to schedule and f/u = pt aware and agreeable  - cont ACEI 20mg QD - (+) dry cough with ACEI but tolerable and pt does not want to switch to different antihypertensive   - decreased intermittent chest tightness - not painful - taking deep breaths relieves the "pounding"   - drinks 16oz/caffeine day   - has been cutting back on drinking as its becoming "unattractive"    - "have been smoking too much lately" - 4-7cigs/day - advised cessation   - (+) FHx of MI in Father and PGF btw 60-64yo   - nml renal function and urine microalbumin  - cont ACEI 20mg QD, CCB 5mg QD and HCTZ 12 5mg QD for now   - advised to STOP drinking EtOH and STOP smoking  - RTO in 1months for f/u = pt aware and agreeable  Cough due to ACE inhibitor    Anxiety and depression  -     sertraline (Zoloft) 100 mg tablet; Take 1 tablet (100 mg total) by mouth daily  - stopped taking Lexapro 10mg QD and would like to be on smth   - Zoloft was increased to 50mg QD at last OV in 12/2021  - has been self-medicating with EtOH, has had low grade depression since a teen but has gotten worse given recent loses   - PHQ-9 score of 9 <-- 8 <-- 4  - denies SI/HI - "I don't think of hurting myself"   - CHRIS-7 score of 1 <-- 2 <-- 2  - will increase Zoloft to 100mg QD   - referral given to interim in-office therapist and pt also referred to [facility]   - has been referred to Addiction Med and Psych in the past   - RTO in 1month for f/u - pt aware and agreeable   Current every day smoker  -     Ambulatory Referral to Smoking Cessation Program; Future  Heavy drinker of alcohol  Excessive caffeine intake  Depression, recurrent [facility])    Family history of heart attack  -     atorvastatin (LIPITOR) 10 mg tablet; Take 1 tablet (10 mg total) by mouth daily  Elevated LDL cholesterol level  -     atorvastatin (LIPITOR) 10 mg tablet; Take 1 tablet (10 mg total) by mouth daily  - Lipids (4/5/2021): , TG 90, ,    - Lipids (11/29/2021): , , ,   - unable to tolerate Crestor 2/2 diarrhea   - will do trial of Lipitor - if no success, t/c referral to Lipidologit         Subjective:    Patient ID: Lisandro Llamas is a 59 y o  female    HPI  1) HTN/HL  - established care on 2/9/2021 and was started on ACEI 10mg QD for elevated pressures   - was seen in the office on 2/24/2021 and ACEI was increased to 20mg QD   - CCB 10mg QD was added on 3/26/2021  - did see Cardio 6/4/2021 - noted to have swelling with CCB, dose was decreased to 5mg QD and HCTZ 12 5mg QD was added  - stopped taking Crestor 5mg as it was causing diarrhea - has been advised by Cardio to restart   - UTD with Flu vaccine   - UTD with COVID IMMs and Booster   - BP in the office 120/86 and on my repeat 118/80  - has been cutting back on drinking as its becoming "unattractive" - now avoiding hard alcohol (from 2-3 shots and 2-3glasses of wine)   - "have been smoking too much lately" - 4-7cigs/day   - decreased intermittent chest tightness - not painful - taking deep breaths relieves the "pounding"   - (+) dry cough - in the AM   - denies F/C/N/V/change in vision/HA/CP/SOB/wheezing/abd pain/D/C/LE edema   - drinking 16oz/caffeine/day   - (+) FHx of MI in Father and PGF btw 60-64yo   - (+) FHx of HL   - nml renal function and urine microalbumin   - Lipids (4/5/2021): , TG 90, ,    - Lipids (11/29/2021): , , ,    - UTD with annual GYN exam and PAP (4/29/2021)   - canceled Cscope scheduled with GI and has Cologuard pending   2) Depression/Vit D deficiency   - stopped taking Lexapro 10mg QD and would like to be on smth   - Zoloft was increased to 50mg QD at last OV in 12/2021   - has been self-medicating with EtOH, has had low grade depression since a teen but has gotten worse given recent loses   - PHQ-9 score of 9 <-- 8 <-- 4  - denies SI/HI - "I don't think of hurting myself"   - CHRIS-7 score of 1 <-- 2 <-- 2      The following portions of the patient's history were reviewed and updated as appropriate: allergies, current medications, past family history, past medical history, past social history, past surgical history and problem list     Review of Systems  as per HPI    Objective:  /80 (BP Location: Left arm, Patient Position: Sitting, Cuff Size: Standard)   Pulse 98   Resp 16   Ht 5' 7" (1 702 m)   Wt 53 5 kg (118 lb)   SpO2 98%   BMI 18 48 kg/m²    Physical Exam  Vitals reviewed  Constitutional:       General: She is not in acute distress  Appearance: Normal appearance  She is not ill-appearing, toxic-appearing or diaphoretic  HENT:      Head: Normocephalic and atraumatic  Right Ear: External ear normal       Left Ear: External ear normal    Eyes:      General: No scleral icterus  Right eye: No discharge  Left eye: No discharge  Extraocular Movements: Extraocular movements intact  Conjunctiva/sclera: Conjunctivae normal    Cardiovascular:      Rate and Rhythm: Normal rate and regular rhythm  Heart sounds: Normal heart sounds  No murmur heard  No friction rub  No gallop  Pulmonary:      Effort: Pulmonary effort is normal  No respiratory distress  Breath sounds: Normal breath sounds  No stridor  No wheezing, rhonchi or rales  Abdominal:      General: Bowel sounds are normal       Palpations: Abdomen is soft  Musculoskeletal:         General: Normal range of motion  Cervical back: Normal range of motion and neck supple  Right lower leg: No edema  Left lower leg: No edema  Skin:     General: Skin is warm     Neurological:      General: No focal deficit present  Mental Status: She is alert and oriented to person, place, and time  Psychiatric:         Attention and Perception: Attention and perception normal          Mood and Affect: Affect normal  Mood is depressed  Speech: Speech normal  She is communicative  Speech is not rapid and pressured  Behavior: Behavior normal  Behavior is cooperative  Thought Content: Thought content normal  Thought content does not include homicidal or suicidal ideation  Thought content does not include homicidal or suicidal plan  Cognition and Memory: Cognition and memory normal          Judgment: Judgment normal       Comments: PHQ-9 score of 9, CHRIS-7 score of 1, denies SI/HI         BMI Counseling: Body mass index is 18 48 kg/m²  The BMI is below normal  Patient was advised to gain weight  Dietary education for weight gain was provided to the patient today  Depression Screening Follow-up Plan: Patient's depression screening was positive with a PHQ-2 score of   Their PHQ-9 score was 9  Patient assessed for underlying major depression  They have no active suicidal ideations  Brief counseling provided and recommend additional follow-up/re-evaluation next office visit  Continue regular follow-up with their psychologist/therapist/psychiatrist who is managing their mental health condition(s)  Patient advised to follow-up with PCP for further management

## 2022-02-16 ENCOUNTER — OFFICE VISIT (OUTPATIENT)
Dept: FAMILY MEDICINE CLINIC | Facility: CLINIC | Age: 62
End: 2022-02-16
Payer: COMMERCIAL

## 2022-02-16 VITALS
RESPIRATION RATE: 16 BRPM | HEART RATE: 68 BPM | HEIGHT: 67 IN | SYSTOLIC BLOOD PRESSURE: 128 MMHG | DIASTOLIC BLOOD PRESSURE: 80 MMHG | BODY MASS INDEX: 18.83 KG/M2 | WEIGHT: 120 LBS | OXYGEN SATURATION: 98 %

## 2022-02-16 DIAGNOSIS — Z53.20 MAMMOGRAM DECLINED: ICD-10-CM

## 2022-02-16 DIAGNOSIS — E55.9 VITAMIN D DEFICIENCY: ICD-10-CM

## 2022-02-16 DIAGNOSIS — F33.9 DEPRESSION, RECURRENT (HCC): ICD-10-CM

## 2022-02-16 DIAGNOSIS — S82.839A AVULSION FRACTURE OF DISTAL FIBULA: ICD-10-CM

## 2022-02-16 DIAGNOSIS — I10 ESSENTIAL HYPERTENSION: ICD-10-CM

## 2022-02-16 DIAGNOSIS — Z53.20 STATIN DECLINED: ICD-10-CM

## 2022-02-16 DIAGNOSIS — T46.4X5A COUGH DUE TO ACE INHIBITOR: ICD-10-CM

## 2022-02-16 DIAGNOSIS — F17.200 CURRENT EVERY DAY SMOKER: ICD-10-CM

## 2022-02-16 DIAGNOSIS — Z78.9 HEAVY DRINKER OF ALCOHOL: ICD-10-CM

## 2022-02-16 DIAGNOSIS — Z82.49 FAMILY HISTORY OF HEART ATTACK: ICD-10-CM

## 2022-02-16 DIAGNOSIS — Z12.11 COLON CANCER SCREENING: ICD-10-CM

## 2022-02-16 DIAGNOSIS — E78.00 ELEVATED LDL CHOLESTEROL LEVEL: ICD-10-CM

## 2022-02-16 DIAGNOSIS — Z00.00 ANNUAL PHYSICAL EXAM: Primary | ICD-10-CM

## 2022-02-16 DIAGNOSIS — F41.9 ANXIETY AND DEPRESSION: ICD-10-CM

## 2022-02-16 DIAGNOSIS — F32.A ANXIETY AND DEPRESSION: ICD-10-CM

## 2022-02-16 DIAGNOSIS — R05.8 COUGH DUE TO ACE INHIBITOR: ICD-10-CM

## 2022-02-16 PROCEDURE — 99396 PREV VISIT EST AGE 40-64: CPT | Performed by: FAMILY MEDICINE

## 2022-02-16 PROCEDURE — 3079F DIAST BP 80-89 MM HG: CPT | Performed by: FAMILY MEDICINE

## 2022-02-16 PROCEDURE — 4004F PT TOBACCO SCREEN RCVD TLK: CPT | Performed by: FAMILY MEDICINE

## 2022-02-16 PROCEDURE — 3725F SCREEN DEPRESSION PERFORMED: CPT | Performed by: FAMILY MEDICINE

## 2022-02-16 PROCEDURE — 3074F SYST BP LT 130 MM HG: CPT | Performed by: FAMILY MEDICINE

## 2022-02-16 PROCEDURE — 99214 OFFICE O/P EST MOD 30 MIN: CPT | Performed by: FAMILY MEDICINE

## 2022-02-16 PROCEDURE — 3008F BODY MASS INDEX DOCD: CPT | Performed by: FAMILY MEDICINE

## 2022-02-16 RX ORDER — SERTRALINE HYDROCHLORIDE 100 MG/1
100 TABLET, FILM COATED ORAL DAILY
Qty: 30 TABLET | Refills: 0 | Status: SHIPPED | OUTPATIENT
Start: 2022-02-16 | End: 2022-03-16

## 2022-02-16 RX ORDER — BUPROPION HYDROCHLORIDE 150 MG/1
150 TABLET ORAL DAILY
Qty: 30 TABLET | Refills: 0 | Status: SHIPPED | OUTPATIENT
Start: 2022-02-16 | End: 2022-03-16

## 2022-02-16 RX ORDER — ATORVASTATIN CALCIUM 10 MG/1
TABLET, FILM COATED ORAL
Qty: 30 TABLET | Refills: 0 | Status: SHIPPED | OUTPATIENT
Start: 2022-02-16

## 2022-02-16 RX ORDER — LOSARTAN POTASSIUM 50 MG/1
50 TABLET ORAL DAILY
Qty: 30 TABLET | Refills: 2 | Status: SHIPPED | OUTPATIENT
Start: 2022-02-16 | End: 2022-05-17

## 2022-02-16 NOTE — PATIENT INSTRUCTIONS

## 2022-02-16 NOTE — PROGRESS NOTES
Assessment/Plan:   Diagnoses and all orders for this visit:    Annual physical exam  - reviewed PMHx, PSHx, meds, allergies, FHx, Soc Hx and Sexual Hx  - UTD with Tdap and Flu vaccine for this season   - UTD with COVID IMMs and Booster   - discussed diet and exercise   - follows with [facility] = last annual/PAP was 4/29/2021  - declined Breast Ca screening in the office today   - declined STD screening in the office today   - has never had Colon Ca screening - script given for Cologuard and has been referred to GI in the past  - overdue for Optho and Dental   - RTO in 1yr for annual - pt aware and agreeable    Essential hypertension  -     losartan (COZAAR) 50 mg tablet; Take 1 tablet (50 mg total) by mouth daily  -     Ambulatory Referral to Ophthalmology;  Future  - BP stable in the office   - did see Cardio 6/4/2021 - noted to have swelling with CCB, dose was decreased to 5mg QD and HCTZ 12 5mg QD was added - her insurance does not cover [facility] - referral given for Cardio in [facility] - advised to schedule and f/u = pt aware and agreeable  - (+) dry cough with ACEI - will switch to ARB 50mg QD   - drinks 16oz/caffeine day   - has been cutting back on drinking as its becoming "unattractive"    - "have been smoking too much lately" - 4-7cigs/day - advised cessation   - (+) FHx of MI in Father and PGF btw 60-64yo   - nml renal function and urine microalbumin  - cont CCB 5mg QD and HCTZ 12 5mg QD for now, will change ACEI to [facility]  - advised to STOP drinking EtOH and STOP smoking  - RTO in 1months for f/u = pt aware and agreeable  Cough due to ACE inhibitor  Family history of heart attack  Current every day smoker  Heavy drinker of alcohol    Elevated LDL cholesterol level  - Lipids (4/5/2021): , TG 90, ,    - Lipids (11/29/2021): , , ,   - unable to tolerate Crestor and Lipitor 2/2 diarrhea   - declined statins in the office today  - strongly advised to f/u with Cardio   - The ASCVD Risk score (Jen Cottrell et al , 2013) failed to calculate for the following reasons: The valid HDL cholesterol range is 20 to 100 mg/dL    The valid total cholesterol range is 130 to 320 mg/dL  Statin declined    Anxiety and depression  -     sertraline (Zoloft) 100 mg tablet; Take 1 tablet (100 mg total) by mouth daily  -     buPROPion (Wellbutrin XL) 150 mg 24 hr tablet; Take 1 tablet (150 mg total) by mouth daily  - PHQ-9 score of 6  - denies SI/HI  - CHRIS-7 score of 3   - cont Zoloft 100mg QD and add Wellbutrin XL 150mg QD  - has been referred to interim in-office therapist and [facility]   - has been referred to Addiction Med and Psych in the past   - RTO in 1month for f/u - pt aware and agreeable   Depression, recurrent ([facility] [address] )    Vitamin D deficiency    Colon cancer screening  -     Cologuard    Mammogram declined    Avulsion fracture of distal fibula  -     DXA bone density spine hip and pelvis; Future          Subjective:    Patient ID: Rose Hinojosa is a 59 y o  female    57yo F presents to the office for annual and f/u   1) Annual Exam   - PMHx: Avulsion fracture of distal fibula, HTN, HL, Depression/Anxiety, Vit D deficiency   - allergies: statins - diarrhea   - Meds: CCB 5mg QD, HCTZ 12 5mg QD, ACEI 20mg QD, Zoloft 100mg QD  - PSHx: wisdom tooth extraction   - FHx: Mother (breast ca at 65yo), F (MI at 61), MA (HL), PGF (MI at 61)  - Immunizations: UTD with Tdap (2020), UTD with COVID IMMs and Booster, UTD with annual Flu vaccine   - GYN Hx: [facility], last annual/PAP was 4/29/2021  - Hm: has never had colon ca screening - has been referred to GI and has a script for Cologuard   - diet/exercise: rides horses, diet: varied   - social: avoiding hard alcohol but drinks 2-3glasses/wine/night, current smoker ([id number]), denies illicits   - sexual Hx: active with spouse, declined STD screening in the office today   - last vision: has reading glasses from the [address]  - last dental: does not recall   2) HTN/HL  - established care on 2/9/2021 and was started on ACEI 10mg QD for elevated pressures   - was seen in the office on 2/24/2021 and ACEI was increased to 20mg QD   - CCB 10mg QD was added on 3/26/2021  - did see Cardio 6/4/2021 - noted to have swelling with CCB, dose was decreased to 5mg QD and HCTZ 12 5mg QD was added  - stopped taking Crestor 5mg as it was causing diarrhea - has been advised by Cardio to restart   - stopped taking Lipitor for the same reason   - declined statins in the office today   - BP in the office 124/90 and on my repeat 128/80  - has been cutting back on drinking as its becoming "unattractive" - now avoiding hard alcohol (from 2-3 shots and 2-3glasses of wine)   - "have been smoking too much lately" - 4-7cigs/day   - decreased intermittent chest tightness - not painful - taking deep breaths relieves the "pounding"   - (+) dry cough  - denies F/C/N/V/change in vision/HA/CP/SOB/wheezing/abd pain/D/C/LE edema   - drinking 16oz/caffeine/day   - (+) FHx of MI in Father and PGF btw 60-62yo   - (+) FHx of HL   - nml renal function and urine microalbumin   - Lipids (4/5/2021): , TG 90, ,    - Lipids (11/29/2021): , , ,    3) Depression/Vit D deficiency   - stopped taking Lexapro 10mg QD and would like to be on smth   - Zoloft was increased to 100mg QD at last OV  - has been self-medicating with EtOH, has had low grade depression since a teen but has gotten worse given recent loses   - PHQ-9 score of 6 <-- 9 <-- 8 <-- 4  - denies SI/HI - "I don't think of hurting myself"   - CHRIS-7 score of 3 <-- 1 <-- 2 <-- 2       The following portions of the patient's history were reviewed and updated as appropriate: allergies, current medications, past family history, past medical history, past social history, past surgical history and problem list     Review of Systems  as per HPI    Objective:  /80 (BP Location: Left arm, Patient Position: Sitting, Cuff Size: Standard)   Pulse 68   Resp 16   Ht 5' 7" (1 702 m)   Wt 54 4 kg (120 lb)   SpO2 98%   BMI 18 79 kg/m²    Physical Exam  Vitals reviewed  Constitutional:       General: She is not in acute distress  Appearance: Normal appearance  She is normal weight  She is not ill-appearing, toxic-appearing or diaphoretic  HENT:      Head: Normocephalic and atraumatic  Right Ear: External ear normal       Left Ear: External ear normal    Eyes:      General: No scleral icterus  Right eye: No discharge  Left eye: No discharge  Extraocular Movements: Extraocular movements intact  Conjunctiva/sclera: Conjunctivae normal    Cardiovascular:      Rate and Rhythm: Normal rate and regular rhythm  Heart sounds: Normal heart sounds  No murmur heard  No friction rub  No gallop  Pulmonary:      Effort: Pulmonary effort is normal       Breath sounds: Normal breath sounds  Abdominal:      General: Bowel sounds are normal       Palpations: Abdomen is soft  Musculoskeletal:         General: Normal range of motion  Cervical back: Normal range of motion and neck supple  Right lower leg: No edema  Left lower leg: No edema  Skin:     General: Skin is warm  Neurological:      General: No focal deficit present  Mental Status: She is alert and oriented to person, place, and time  Psychiatric:         Mood and Affect: Affect normal  Mood is depressed  Mood is not anxious  Speech: Speech normal  She is communicative  Speech is not rapid and pressured  Behavior: Behavior normal  Behavior is cooperative  Thought Content: Thought content normal  Thought content does not include homicidal or suicidal ideation  Thought content does not include homicidal or suicidal plan           Cognition and Memory: Cognition normal  Judgment: Judgment normal       Comments: PHQ-9 score of 6, denies SI/HI, CHRIS-7 score of 3         Depression Screening Follow-up Plan: Patient's depression screening was positive with a PHQ-2 score of   Their PHQ-9 score was 6  Patient assessed for underlying major depression  They have no active suicidal ideations  Brief counseling provided and recommend additional follow-up/re-evaluation next office visit  Continue regular follow-up with their psychologist/therapist/psychiatrist who is managing their mental health condition(s)  Patient advised to follow-up with PCP for further management

## 2022-02-17 NOTE — PROGRESS NOTES
Assessment/Plan:   Diagnoses and all orders for this visit:    Essential hypertension  -     losartan (COZAAR) 50 mg tablet; Take 1 tablet (50 mg total) by mouth daily  -     Ambulatory Referral to Ophthalmology; Future  - BP stable in the office   - did see Cardio 6/4/2021 - noted to have swelling with CCB, dose was decreased to 5mg QD and HCTZ 12 5mg QD was added - her insurance does not cover [facility] - referral given for Cardio in [facility] - advised to schedule and f/u = pt aware and agreeable  - (+) dry cough with ACEI - will switch to ARB 50mg QD   - drinks 16oz/caffeine day   - has been cutting back on drinking as its becoming "unattractive"    - "have been smoking too much lately" - 4-7cigs/day - advised cessation   - (+) FHx of MI in Father and PGF btw 60-64yo   - nml renal function and urine microalbumin  - cont CCB 5mg QD and HCTZ 12 5mg QD for now, will change ACEI to [facility]  - advised to STOP drinking EtOH and STOP smoking  - RTO in 1months for f/u = pt aware and agreeable  Cough due to ACE inhibitor  Family history of heart attack  Current every day smoker  Heavy drinker of alcohol    Elevated LDL cholesterol level  - Lipids (4/5/2021): , TG 90, ,    - Lipids (11/29/2021): , , ,   - unable to tolerate Crestor and Lipitor 2/2 diarrhea   - declined statins in the office today  - strongly advised to f/u with Cardio   - The ASCVD Risk score (Oneal Bloom et al , 2013) failed to calculate for the following reasons: The valid HDL cholesterol range is 20 to 100 mg/dL    The valid total cholesterol range is 130 to 320 mg/dL  Statin declined    Anxiety and depression  -     sertraline (Zoloft) 100 mg tablet; Take 1 tablet (100 mg total) by mouth daily  -     buPROPion (Wellbutrin XL) 150 mg 24 hr tablet;  Take 1 tablet (150 mg total) by mouth daily  - PHQ-9 score of 6  - denies SI/HI  - CHRIS-7 score of 3   - cont Zoloft 100mg QD and add Wellbutrin XL 150mg QD  - has been referred to interim in-office therapist and [facility]   - has been referred to Addiction Med and Psych in the past   - RTO in 1month for f/u - pt aware and agreeable   Depression, recurrent ([address] )    Vitamin D deficiency    Colon cancer screening  -     Cologuard    Mammogram declined    Avulsion fracture of distal fibula  -     DXA bone density spine hip and pelvis; Future          Subjective:    Patient ID: Jayme Orourke is a 59 y o  female    57yo F presents to the office for annual and f/u   1) Annual Exam   - PMHx: Avulsion fracture of distal fibula, HTN, HL, Depression/Anxiety, Vit D deficiency   - allergies: statins - diarrhea   - Meds: CCB 5mg QD, HCTZ 12 5mg QD, ACEI 20mg QD, Zoloft 100mg QD  - PSHx: wisdom tooth extraction   - FHx: Mother (breast ca at 67yo), F (MI at 61), MA (HL), PGF (MI at 61)  - Immunizations: UTD with Tdap (2020), UTD with COVID IMMs and Booster, UTD with annual Flu vaccine   - GYN Hx: [facility], last annual/PAP was 4/29/2021  - Hm: has never had colon ca screening - has been referred to GI and has a script for Cologuard   - diet/exercise: rides horses, diet: varied   - social: avoiding hard alcohol but drinks 2-3glasses/wine/night, current smoker ([id number]), denies illicits   - sexual Hx: active with spouse, declined STD screening in the office today   - last vision: has reading glasses from the [address]  - last dental: does not recall   2) HTN/HL  - established care on 2/9/2021 and was started on ACEI 10mg QD for elevated pressures   - was seen in the office on 2/24/2021 and ACEI was increased to 20mg QD   - CCB 10mg QD was added on 3/26/2021  - did see Cardio 6/4/2021 - noted to have swelling with CCB, dose was decreased to 5mg QD and HCTZ 12 5mg QD was added  - stopped taking Crestor 5mg as it was causing diarrhea - has been advised by Cardio to restart   - stopped taking Lipitor for the same reason   - declined statins in the office today   - BP in the office 124/90 and on my repeat 128/80  - has been cutting back on drinking as its becoming "unattractive" - now avoiding hard alcohol (from 2-3 shots and 2-3glasses of wine)   - "have been smoking too much lately" - 4-7cigs/day   - decreased intermittent chest tightness - not painful - taking deep breaths relieves the "pounding"   - (+) dry cough  - denies F/C/N/V/change in vision/HA/CP/SOB/wheezing/abd pain/D/C/LE edema   - drinking 16oz/caffeine/day   - (+) FHx of MI in Father and PGF btw 60-62yo   - (+) FHx of HL   - nml renal function and urine microalbumin   - Lipids (4/5/2021): , TG 90, ,    - Lipids (11/29/2021): , , ,    3) Depression/Vit D deficiency   - stopped taking Lexapro 10mg QD and would like to be on smth   - Zoloft was increased to 100mg QD at last OV  - has been self-medicating with EtOH, has had low grade depression since a teen but has gotten worse given recent loses   - PHQ-9 score of 6 <-- 9 <-- 8 <-- 4  - denies SI/HI - "I don't think of hurting myself"   - CHRIS-7 score of 3 <-- 1 <-- 2 <-- 2       The following portions of the patient's history were reviewed and updated as appropriate: allergies, current medications, past family history, past medical history, past social history, past surgical history and problem list     Review of Systems  as per HPI    Objective:  /80 (BP Location: Left arm, Patient Position: Sitting, Cuff Size: Standard)   Pulse 68   Resp 16   Ht 5' 7" (1 702 m)   Wt 54 4 kg (120 lb)   SpO2 98%   BMI 18 79 kg/m²    Physical Exam  Vitals reviewed  Constitutional:       General: She is not in acute distress  Appearance: Normal appearance  She is normal weight  She is not ill-appearing, toxic-appearing or diaphoretic  HENT:      Head: Normocephalic and atraumatic        Right Ear: External ear normal       Left Ear: External ear normal    Eyes:      General: No scleral icterus  Right eye: No discharge  Left eye: No discharge  Extraocular Movements: Extraocular movements intact  Conjunctiva/sclera: Conjunctivae normal    Cardiovascular:      Rate and Rhythm: Normal rate and regular rhythm  Heart sounds: Normal heart sounds  No murmur heard  No friction rub  No gallop  Pulmonary:      Effort: Pulmonary effort is normal       Breath sounds: Normal breath sounds  Abdominal:      General: Bowel sounds are normal       Palpations: Abdomen is soft  Musculoskeletal:         General: Normal range of motion  Cervical back: Normal range of motion and neck supple  Right lower leg: No edema  Left lower leg: No edema  Skin:     General: Skin is warm  Neurological:      General: No focal deficit present  Mental Status: She is alert and oriented to person, place, and time  Psychiatric:         Mood and Affect: Affect normal  Mood is depressed  Mood is not anxious  Speech: Speech normal  She is communicative  Speech is not rapid and pressured  Behavior: Behavior normal  Behavior is cooperative  Thought Content: Thought content normal  Thought content does not include homicidal or suicidal ideation  Thought content does not include homicidal or suicidal plan  Cognition and Memory: Cognition normal          Judgment: Judgment normal       Comments: PHQ-9 score of 6, denies SI/HI, CHRIS-7 score of 3         Depression Screening Follow-up Plan: Patient's depression screening was positive with a PHQ-2 score of   Their PHQ-9 score was 6  Patient assessed for underlying major depression  They have no active suicidal ideations  Brief counseling provided and recommend additional follow-up/re-evaluation next office visit  Continue regular follow-up with their psychologist/therapist/psychiatrist who is managing their mental health condition(s)  Patient advised to follow-up with PCP for further management

## 2022-03-16 ENCOUNTER — OFFICE VISIT (OUTPATIENT)
Dept: FAMILY MEDICINE CLINIC | Facility: CLINIC | Age: 62
End: 2022-03-16
Payer: COMMERCIAL

## 2022-03-16 VITALS
RESPIRATION RATE: 16 BRPM | WEIGHT: 115 LBS | OXYGEN SATURATION: 99 % | DIASTOLIC BLOOD PRESSURE: 80 MMHG | HEART RATE: 88 BPM | HEIGHT: 67 IN | BODY MASS INDEX: 18.05 KG/M2 | SYSTOLIC BLOOD PRESSURE: 110 MMHG

## 2022-03-16 DIAGNOSIS — Z12.11 COLON CANCER SCREENING: ICD-10-CM

## 2022-03-16 DIAGNOSIS — Z78.9 HEAVY DRINKER OF ALCOHOL: ICD-10-CM

## 2022-03-16 DIAGNOSIS — Z53.20 STATIN DECLINED: ICD-10-CM

## 2022-03-16 DIAGNOSIS — E78.00 ELEVATED LDL CHOLESTEROL LEVEL: ICD-10-CM

## 2022-03-16 DIAGNOSIS — Z78.9 STATIN INTOLERANCE: ICD-10-CM

## 2022-03-16 DIAGNOSIS — F17.200 CURRENT EVERY DAY SMOKER: ICD-10-CM

## 2022-03-16 DIAGNOSIS — I10 ESSENTIAL HYPERTENSION: Primary | ICD-10-CM

## 2022-03-16 DIAGNOSIS — R05.8 COUGH DUE TO ACE INHIBITOR: ICD-10-CM

## 2022-03-16 DIAGNOSIS — S82.839A AVULSION FRACTURE OF DISTAL FIBULA: ICD-10-CM

## 2022-03-16 DIAGNOSIS — Z82.49 FAMILY HISTORY OF HEART ATTACK: ICD-10-CM

## 2022-03-16 DIAGNOSIS — T46.4X5A COUGH DUE TO ACE INHIBITOR: ICD-10-CM

## 2022-03-16 DIAGNOSIS — F33.9 DEPRESSION, RECURRENT (HCC): ICD-10-CM

## 2022-03-16 PROCEDURE — 3079F DIAST BP 80-89 MM HG: CPT | Performed by: FAMILY MEDICINE

## 2022-03-16 PROCEDURE — 99214 OFFICE O/P EST MOD 30 MIN: CPT | Performed by: FAMILY MEDICINE

## 2022-03-16 PROCEDURE — 4004F PT TOBACCO SCREEN RCVD TLK: CPT | Performed by: FAMILY MEDICINE

## 2022-03-16 PROCEDURE — 3074F SYST BP LT 130 MM HG: CPT | Performed by: FAMILY MEDICINE

## 2022-03-16 PROCEDURE — 3725F SCREEN DEPRESSION PERFORMED: CPT | Performed by: FAMILY MEDICINE

## 2022-03-16 PROCEDURE — 3008F BODY MASS INDEX DOCD: CPT | Performed by: FAMILY MEDICINE

## 2022-03-16 RX ORDER — AMLODIPINE BESYLATE 5 MG/1
5 TABLET ORAL DAILY
Qty: 90 TABLET | Refills: 0 | Status: SHIPPED | OUTPATIENT
Start: 2022-03-16

## 2022-03-16 NOTE — PROGRESS NOTES
Assessment/Plan:   Diagnoses and all orders for this visit:    Essential hypertension  -     amLODIPine (NORVASC) 5 mg tablet; Take 1 tablet (5 mg total) by mouth daily  - BP in the office stable x2  - cough improved after removal of ACEI   - cont current regimen: CCB 5mg QD, HCTZ 12 5mg QD and ARB 50mg QD  - has been referred to Optho   - has been re-referred to Cardio  - (+) FHx of MI in Father and PGF btw 60-64yo   - nml renal function and urine microalbumin   - advised to STOP drinking EtOH and STOP smoking  - RTO in 3months for f/u = pt aware and agreeable  Cough due to ACE inhibitor  Family history of heart attack  Current every day smoker  Heavy drinker of alcohol  -     Ambulatory Referral to Chemical Dependency; Future    Elevated LDL cholesterol level  -     Ambulatory Referral to Cardiology; Future  - Lipids (4/5/2021): , TG 90, ,    - Lipids (11/29/2021): , , ,   - unable to tolerate Crestor and Lipitor 2/2 diarrhea   - declined statins in the office today  - strongly advised to f/u with Cardio   Statin intolerance  Statin declined  -     Ambulatory Referral to Cardiology; Future    Colon cancer screening  - did [facility] on 3/14/2022 - results pending     Depression, recurrent ([facility] [address] )  - (+) "depression is better"  - PHQ-9 score of 5 <-- 6  - denies SI/HI  - CHRIS-7 score of 1 <-- 3   - cont Zoloft 100mg QD and Wellbutrin XL 150mg QD  - has been referred to interim in-office therapist and [facility]   - has been referred to Addiction Med and Psych in the past   - RTO in 3month for f/u - pt aware and agreeable     Avulsion fracture of distal fibula  -     DXA bone density spine hip and pelvis; Future          Subjective:    Patient ID: Narayan Alexander is a 59 y o  female    HPI   59yo F presents to the office for f/u   - BP in the office 122/86 and on my repeat 110/80   - cough is better without ACEI   - currently on ARB 50mg QD, CCB 5mg QD and HCTZ 12 5mg QD   - drinking 3 glasses/wine/day but not drinking liquor   - did [facility] on 3/14/2022 - results pending   - (+) "depression is better"  - PHQ-9 score of 5 <-- 6  - denies SI/HI  - CHRIS-7 score of 1 <-- 3   - denies F/C/N/V/HA/change in vision/CP/palpitations/SOB/wheezing/cough/abd pain/LE edema         The following portions of the patient's history were reviewed and updated as appropriate: allergies, current medications, past family history, past medical history, past social history, past surgical history and problem list     Review of Systems  as per HPI    Objective:  /80 (BP Location: Left arm, Patient Position: Sitting, Cuff Size: Standard)   Pulse 88   Resp 16   Ht 5' 7" (1 702 m)   Wt 52 2 kg (115 lb)   SpO2 99%   BMI 18 01 kg/m²    Physical Exam  Vitals reviewed  Constitutional:       General: She is not in acute distress  Appearance: Normal appearance  She is not ill-appearing, toxic-appearing or diaphoretic  HENT:      Head: Normocephalic and atraumatic  Right Ear: External ear normal       Left Ear: External ear normal    Eyes:      General: No scleral icterus  Right eye: No discharge  Left eye: No discharge  Extraocular Movements: Extraocular movements intact  Conjunctiva/sclera: Conjunctivae normal    Cardiovascular:      Rate and Rhythm: Normal rate and regular rhythm  Heart sounds: Normal heart sounds  No murmur heard  No friction rub  No gallop  Pulmonary:      Effort: Pulmonary effort is normal  No respiratory distress  Breath sounds: Normal breath sounds  No stridor  No wheezing, rhonchi or rales  Abdominal:      General: Bowel sounds are normal       Palpations: Abdomen is soft  Musculoskeletal:         General: Normal range of motion  Cervical back: Normal range of motion and neck supple  Right lower leg: No edema  Left lower leg: No edema  Skin:     General: Skin is warm     Neurological:      General: No focal deficit present  Mental Status: She is alert and oriented to person, place, and time  Psychiatric:         Thought Content: Thought content does not include homicidal or suicidal ideation  Thought content does not include homicidal or suicidal plan  Comments: - PHQ-9 score of 5 <-- 6  - denies SI/HI  - CHRIS-7 score of 1 <-- 3          BMI Counseling: Body mass index is 18 01 kg/m²  The BMI is below normal  Patient was advised to gain weight  Depression Screening Follow-up Plan: Patient's depression screening was positive with a PHQ-2 score of   Their PHQ-9 score was 5  Patient assessed for underlying major depression  They have no active suicidal ideations  Brief counseling provided and recommend additional follow-up/re-evaluation next office visit  Continue regular follow-up with their psychologist/therapist/psychiatrist who is managing their mental health condition(s)  Patient advised to follow-up with PCP for further management

## 2022-03-21 LAB — COLOGUARD RESULT REPORTABLE: NEGATIVE

## 2022-05-17 DIAGNOSIS — I10 ESSENTIAL HYPERTENSION: ICD-10-CM

## 2022-05-17 RX ORDER — LOSARTAN POTASSIUM 50 MG/1
TABLET ORAL
Qty: 90 TABLET | Refills: 0 | Status: SHIPPED | OUTPATIENT
Start: 2022-05-17

## 2022-05-29 DIAGNOSIS — Z82.49 FAMILY HISTORY OF HEART DISEASE: ICD-10-CM

## 2022-05-29 DIAGNOSIS — I10 ESSENTIAL HYPERTENSION: ICD-10-CM

## 2022-05-29 DIAGNOSIS — R07.89 CHEST TIGHTNESS: ICD-10-CM

## 2022-05-29 DIAGNOSIS — Z72.0 TOBACCO ABUSE: ICD-10-CM

## 2022-05-29 DIAGNOSIS — R60.0 LOWER EXTREMITY EDEMA: ICD-10-CM

## 2022-05-31 RX ORDER — HYDROCHLOROTHIAZIDE 12.5 MG/1
TABLET ORAL
Qty: 90 TABLET | Refills: 1 | Status: SHIPPED | OUTPATIENT
Start: 2022-05-31

## 2022-06-10 DIAGNOSIS — F32.A ANXIETY AND DEPRESSION: ICD-10-CM

## 2022-06-10 DIAGNOSIS — F41.9 ANXIETY AND DEPRESSION: ICD-10-CM

## 2022-06-10 RX ORDER — BUPROPION HYDROCHLORIDE 150 MG/1
TABLET ORAL
Qty: 90 TABLET | Refills: 0 | Status: SHIPPED | OUTPATIENT
Start: 2022-06-10

## 2022-08-02 ENCOUNTER — VBI (OUTPATIENT)
Dept: ADMINISTRATIVE | Facility: OTHER | Age: 62
End: 2022-08-02

## 2022-09-16 ENCOUNTER — TELEPHONE (OUTPATIENT)
Dept: PSYCHIATRY | Facility: CLINIC | Age: 62
End: 2022-09-16

## 2022-09-16 NOTE — TELEPHONE ENCOUNTER
contacted patient off [facility] waitlist  to verify needs of services in attempts to update waitlist  lvm for patient to contact intake dept

## 2022-10-11 PROBLEM — T46.4X5A COUGH DUE TO ACE INHIBITOR: Status: RESOLVED | Noted: 2022-03-16 | Resolved: 2022-10-11

## 2022-10-11 PROBLEM — R05.8 COUGH DUE TO ACE INHIBITOR: Status: RESOLVED | Noted: 2022-03-16 | Resolved: 2022-10-11

## 2025-05-29 ENCOUNTER — DOCUMENTATION (OUTPATIENT)
Dept: ADMINISTRATIVE | Facility: OTHER | Age: 65
End: 2025-05-29

## 2025-05-29 NOTE — PROGRESS NOTES
05/29/25 9:19 AM    [facility] outreach is not required, pt with [facility] since June 2022.    Thank you.  Tyrel Pérez MA  [facility]